# Patient Record
Sex: FEMALE | Race: BLACK OR AFRICAN AMERICAN | Employment: STUDENT | URBAN - METROPOLITAN AREA
[De-identification: names, ages, dates, MRNs, and addresses within clinical notes are randomized per-mention and may not be internally consistent; named-entity substitution may affect disease eponyms.]

---
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
## 2021-01-15 ENCOUNTER — OFFICE VISIT (OUTPATIENT)
Dept: FAMILY MEDICINE CLINIC | Age: 21
End: 2021-01-15
Payer: COMMERCIAL

## 2021-01-15 VITALS
WEIGHT: 122.6 LBS | HEIGHT: 60 IN | OXYGEN SATURATION: 98 % | HEART RATE: 78 BPM | TEMPERATURE: 97.5 F | DIASTOLIC BLOOD PRESSURE: 77 MMHG | BODY MASS INDEX: 24.07 KG/M2 | SYSTOLIC BLOOD PRESSURE: 114 MMHG

## 2021-01-15 DIAGNOSIS — L65.9 ALOPECIA: ICD-10-CM

## 2021-01-15 DIAGNOSIS — N92.6 IRREGULAR MENSTRUAL BLEEDING: Primary | ICD-10-CM

## 2021-01-15 DIAGNOSIS — N92.6 IRREGULAR MENSTRUAL BLEEDING: ICD-10-CM

## 2021-01-15 PROCEDURE — 99214 OFFICE O/P EST MOD 30 MIN: CPT | Performed by: FAMILY MEDICINE

## 2021-01-15 ASSESSMENT — PATIENT HEALTH QUESTIONNAIRE - PHQ9
SUM OF ALL RESPONSES TO PHQ QUESTIONS 1-9: 2
SUM OF ALL RESPONSES TO PHQ QUESTIONS 1-9: 2
1. LITTLE INTEREST OR PLEASURE IN DOING THINGS: 1

## 2021-01-15 NOTE — PROGRESS NOTES
Chief Complaint   Patient presents with    Alopecia     Patient presents today c/o hair loss. Patient states this has been an ongoing problem but getting worse. HPI: Adilene Sosa 24 y.o. female presenting for    Fatigue/Irregular Menstrual Cycles   Patient complaining of hair loss, fatigue, denies any weight gain, denies any irregular menstrual cycles   Periods were heavy on the first day but now more scant. Denies any male pattern hair growth   Hair loss   Patient uses oil, shea moisture hair cream, and hair grease   Shampoos- suave conditioner and shampoo  Hair loss started in 8th grade and has easy breakage but increased this past year  Admits to school stress   Patient does straighten hair with heat but not on a regular basis Denies any blow drying   Admits to braided hair styles   Denies any tension at the root. Dysphasia  Patient is coming in with a one-month history of dysphasia. Patient reports that its only associated with solid foods. Patient reports that when she chews her food and swallow she knows that she is regurgitating the food and has to chew it more before it fully swallows. Patient reports initially there was some halitosis but that has resolved. Denies vomiting. Denies any abdominal pain associated with it. She denies any history of abnormalities of her esophagus. She denies any fevers, chills, nausea, vomiting, chest pain, shortness of breath, abdominal pain, change in urination. F/u  Stable no issues or concerns. Patient is not taking the pecpid prescribed by GI      Diarrhea  Patient reports a 2-month history of diarrhea. Reports that it is off and on. Feels like her symptoms overall improving however notices that she has loose bowel movements. There is no history of ulcerative colitis or Crohn's disease in the family. Denies any bleeding in her stools. Patient has not tried any medications to help with her symptoms.   Patient's last thyroid was over a year ago and was normal.  Denies any anxiety, palpitations, tremors. F/u  Stable. No issues or concerns. Stool studies were normal.         Current Outpatient Medications   Medication Sig Dispense Refill    Multiple Vitamins-Minerals (WOMENS MULTI VITAMIN & MINERAL PO) Take by mouth      Cetirizine HCl (ZYRTEC ALLERGY) 10 MG CAPS Take by mouth      famotidine (PEPCID) 20 MG tablet Take 1 tablet by mouth daily (Patient not taking: Reported on 1/15/2021) 30 tablet 2    minocycline (MINOCIN;DYNACIN) 100 MG capsule Take 100 mg by mouth 2 times daily       No current facility-administered medications for this visit. ROS  CONSTITUTIONAL: The patient denies fevers, chills, sweats and body ache. HEENT: Denies headache, blurry vision, eye pain, tinnitus, vertigo,  sore throat, neck or thyroid masses. Reports difficulty with swallowing. RESPIRATORY: Denies cough, sputum, hemoptysis. CARDIAC: Denies chest pain, pressure, palpitations, Denies lower extremity edema. GASTROINTESTINAL: Denies abdominal pain, constipation, bleeding in the stools, reports diarrhea. GENITOURINARY: Denies dysuria, hematuria, nocturia or frequency, urinary incontinence. NEUROLOGIC: Denies headaches, dizziness, syncope, weakness  MUSCULOSKELETAL: denies changes in range of motion, joint pain, stiffness. ENDOCRINOLOGY: Denies heat or cold intolerance. HEMATOLOGY: Denies easy bleeding or blood transfusion,anemia  DERMATOLOGY: Denies changes in moles or pigmentation changes. PSYCHIATRY: Denies depression, agitation or anxiety. Past Medical History:   Diagnosis Date    Acne     Pes planus     has a prescription for orthotics    Seasonal allergies     zyrtec-D        No past surgical history on file.      Family History   Problem Relation Age of Onset    Diabetes Maternal Grandmother     High Blood Pressure Maternal Grandmother     High Cholesterol Maternal Grandmother     Cancer Maternal Grandmother         breast    Diabetes Maternal Grandfather     High Blood Pressure Maternal Grandfather     High Cholesterol Maternal Grandfather     Kidney Disease Maternal Grandfather     Other Maternal Grandfather         dementia    Thyroid Disease Maternal Aunt     Hypertension Father     Heart Attack Father 50    High Cholesterol Father         Social History     Socioeconomic History    Marital status: Single     Spouse name: Not on file    Number of children: Not on file    Years of education: Not on file    Highest education level: Not on file   Occupational History    Not on file   Social Needs    Financial resource strain: Not hard at all   [place] insecurity     Worry: Never true     Inability: Never true   [employer] needs     Medical: No     Non-medical: No   Tobacco Use    Smoking status: Never Smoker    Smokeless tobacco: Never Used   Substance and Sexual Activity    Alcohol use: No    Drug use: Never    Sexual activity: Never   Lifestyle    Physical activity     Days per week: Not on file     Minutes per session: Not on file    Stress: Not on file   Relationships    Social connections     Talks on phone: Not on file     Gets together: Not on file     Attends [employer] service: Not on file     Active member of club or organization: Not on file     Attends meetings of clubs or organizations: Not on file     Relationship status: Not on file    Intimate partner violence     Fear of current or ex partner: Not on file     Emotionally abused: Not on file     Physically abused: Not on file     Forced sexual activity: Not on file   Other Topics Concern    Not on file   Social History Narrative    Not on file        /77 (Site: Left Upper Arm, Position: Sitting, Cuff Size: Medium Adult)   Pulse 78   Temp 97.5 °F (36.4 °C) (Temporal)   Ht 5' (1.524 m)   Wt 122 lb 9.6 oz (55.6 kg)   SpO2 98%   Breastfeeding No   BMI 23.94 kg/m²        Physical Exam:    General appearance - alert, well appearing, and in no distress  Mental Status - alert, oriented to person, place, and time  Eyes - pupils equal and reactive, extraocular eye movements intact   Ears - bilateral TM's and external ear canals normal   Nose - normal and patent, no erythema, discharge or polyps   Sinuses - Normal paranasal sinuses without tenderness   Throat - mucous membranes moist, pharynx normal without lesions   Neck - supple, no significant adenopathy   Thyroid - thyroid is normal in size without nodules or tenderness    Chest - clear to auscultation, no wheezes, rales or rhonchi, symmetric air entry   Heart - normal rate, regular rhythm, normal S1, S2, no murmurs, rubs, clicks or gallops  Abdomen - soft, nontender, nondistended, no masses or organomegaly. Bowel sounds are present.   Back exam - full range of motion, no tenderness, palpable spasm or pain on motion   Neurological - alert, oriented, normal speech, no focal findings or movement disorder noted   Musculoskeletal - no joint tenderness, deformity or swelling   Extremities - peripheral pulses normal, no pedal edema, no clubbing or cyanosis   Skin - normal coloration and turgor, no rashes, no suspicious skin lesions noted      Labs   TSH   Date Value Ref Range Status   07/16/2020 1.210 0.440 - 3.860 uIU/mL Final     TSH   Date Value Ref Range Status   07/20/2018 0.599 0.270 - 4.200 uIU/mL Final     Lab Results   Component Value Date     07/16/2020    K 4.2 07/16/2020     07/16/2020    CO2 24 07/16/2020    BUN 9 07/16/2020    CREATININE 0.66 07/16/2020    GLUCOSE 87 07/16/2020    CALCIUM 9.5 07/16/2020    PROT 7.8 07/16/2020    LABALBU 4.6 07/16/2020    BILITOT 0.6 07/16/2020    ALKPHOS 68 07/16/2020    AST 25 07/16/2020    ALT 17 07/16/2020    LABGLOM >60.0 07/16/2020    GFRAA >60.0 07/16/2020    GLOB 3.2 07/16/2020       Lab Results   Component Value Date    WBC 3.7 (L) 07/16/2020    HGB 13.8 07/16/2020    HCT 43.1 07/16/2020    MCV 85.5 07/16/2020     07/16/2020 Lab Results   Component Value Date    LABA1C 5.5 07/16/2020     No results found for: EAG        A/P: Eileenbossman Strong 24 y.o. female presenting for    1. Irregular menstrual bleeding  Family history of PCOS. Patient does have irregular menstrual cycles and acne. but is not obese, having hirituism. Is established with a dermatologist   - Follicle Stimulating Hormone; Future  - hCG, quantitative, pregnancy; Future  - Luteinizing Hormone; Future  - Prolactin; Future  - US NON OB TRANSVAGINAL; Future  - US PELVIS COMPLETE; Future    2. Alopecia  Obtain lab levels. Will follow up.

## 2021-01-23 ENCOUNTER — HOSPITAL ENCOUNTER (OUTPATIENT)
Dept: ULTRASOUND IMAGING | Age: 21
Discharge: HOME | End: 2021-01-25
Payer: COMMERCIAL

## 2021-01-23 DIAGNOSIS — N92.6 IRREGULAR MENSTRUAL BLEEDING: ICD-10-CM

## 2021-01-23 PROCEDURE — 76856 US EXAM PELVIC COMPLETE: CPT

## 2021-02-12 DIAGNOSIS — N92.6 IRREGULAR MENSTRUAL BLEEDING: ICD-10-CM

## 2021-02-12 DIAGNOSIS — N92.6 IRREGULAR MENSTRUAL BLEEDING: Primary | ICD-10-CM

## 2021-02-12 LAB
FOLLICLE STIMULATING HORMONE: 7.7 MIU/ML
GONADOTROPIN, CHORIONIC (HCG) QUANT: <0.1 MIU/ML
LUTEINIZING HORMONE: 22.1 MIU/ML
PROLACTIN: 12 NG/ML

## 2021-02-16 LAB — ANA IGG, ELISA: NORMAL

## 2021-03-17 ENCOUNTER — OFFICE VISIT (OUTPATIENT)
Dept: FAMILY MEDICINE CLINIC | Age: 21
End: 2021-03-17
Payer: COMMERCIAL

## 2021-03-17 VITALS
HEART RATE: 81 BPM | TEMPERATURE: 98.1 F | OXYGEN SATURATION: 98 % | BODY MASS INDEX: 23.56 KG/M2 | DIASTOLIC BLOOD PRESSURE: 62 MMHG | HEIGHT: 61 IN | WEIGHT: 124.8 LBS | SYSTOLIC BLOOD PRESSURE: 98 MMHG

## 2021-03-17 DIAGNOSIS — N92.6 IRREGULAR MENSTRUAL BLEEDING: Primary | ICD-10-CM

## 2021-03-17 DIAGNOSIS — L70.0 ACNE VULGARIS: ICD-10-CM

## 2021-03-17 DIAGNOSIS — M79.10 MYALGIA: ICD-10-CM

## 2021-03-17 PROCEDURE — 99214 OFFICE O/P EST MOD 30 MIN: CPT | Performed by: FAMILY MEDICINE

## 2021-03-17 RX ORDER — CETIRIZINE HYDROCHLORIDE 10 MG/1
10 TABLET ORAL DAILY
COMMUNITY

## 2021-03-17 RX ORDER — BIOTIN 1 MG
TABLET ORAL
COMMUNITY
End: 2021-05-21 | Stop reason: SINTOL

## 2021-03-17 ASSESSMENT — PATIENT HEALTH QUESTIONNAIRE - PHQ9
2. FEELING DOWN, DEPRESSED OR HOPELESS: 0
1. LITTLE INTEREST OR PLEASURE IN DOING THINGS: 0
SUM OF ALL RESPONSES TO PHQ9 QUESTIONS 1 & 2: 0

## 2021-03-17 NOTE — PATIENT INSTRUCTIONS
Patient Education        Polycystic Ovary Syndrome: Care Instructions  Your Care Instructions  Polycystic ovary syndrome, or PCOS, means a woman's hormones are out of balance. It can cause problems with your periods and make it hard to get pregnant. Doctors don't know for sure what causes PCOS, but it seems to run in families. It also seems to be linked to obesity and a risk for diabetes. If you have PCOS, your sisters and daughters have a higher chance of getting it too. You may have other symptoms. These include weight gain, acne, too much hair growth on the face or body, high blood pressure, and high blood sugar. Your ovaries may have cysts on them. These cysts are growths filled with fluid. Keep in mind that although you may not have regular periods, you can still get pregnant. Talk to your doctor about birth control if you do not want to get pregnant. Sometimes the hormone changes with PCOS can also make it hard for some women to get pregnant. If this is a concern, talk to your doctor about treatment for this problem. Women who have PCOS can go for months or longer with no period. Your doctor may recommend medicines that can help get your cycles back to normal.  Follow-up care is a key part of your treatment and safety. Be sure to make and go to all appointments, and call your doctor if you are having problems. It's also a good idea to know your test results and keep a list of the medicines you take. How can you care for yourself at home? · Take your medicines exactly as prescribed. Call your doctor if you think you are having a problem with your medicine. · Eat a healthy diet. Include fruits, vegetables, beans, and whole grains in your diet each day. · If you are overweight, losing weight can help with many of the symptoms of PCOS. Talk to your doctor about safe ways to lose weight. · Get at least 30 minutes of exercise on most days of the week. Walking is a good choice.  Or you can run, swim, cycle, or play tennis or team sports. · For hair growth you don't want, try bleaching, plucking, electrolysis, or laser therapy. · Acne can be treated with over-the-counter medicines. Look for ones that have benzoyl peroxide or salicylic acid in them. · If you are feeling sad or depressed, consider talking to a counselor or to other women who have PCOS. It may help. When should you call for help? Call your doctor now or seek immediate medical care if:    · You have severe vaginal bleeding.     · You have new or worse belly or pelvic pain. Watch closely for changes in your health, and be sure to contact your doctor if:    · You do not get better as expected.     · You have unusual vaginal bleeding. Where can you learn more? Go to https://[employer]. org and sign in to your [employer] account. Enter B657 in the [employer] box to learn more about \"Polycystic Ovary Syndrome: Care Instructions. \"     If you do not have an account, please click on the \"Sign Up Now\" link. Current as of: July 17, 2020               Content Version: 12.8  © 5858-7069 Pins. Care instructions adapted under license by [facility] ([facility]). If you have questions about a medical condition or this instruction, always ask your healthcare professional. [place] [address] any warranty or liability for your use of this information. Patient Education        Knee: Exercises  Introduction  Here are some examples of exercises for you to try. The exercises may be suggested for a condition or for rehabilitation. Start each exercise slowly. Ease off the exercises if you start to have pain. You will be told when to start these exercises and which ones will work best for you. How to do the exercises  Quad sets   1. Sit with your leg straight and supported on the floor or a firm bed.  (If you feel discomfort in the front or back of your knee, place a small towel roll under your put that foot in front of your injured knee. Do not drop your hip back. 3. Tighten the muscles on the front of your thigh to straighten your injured knee. 4. Keep your kneecap pointing forward, and lift your whole leg up toward the ceiling about 6 inches. Hold for about 6 seconds, then lower slowly. 5. Do 8 to 12 repetitions. Heel dig bridging   1. Lie on your back with both knees bent and your ankles bent so that only your heels are digging into the floor. Your knees should be bent about 90 degrees. 2. Then push your heels into the floor, squeeze your buttocks, and lift your hips off the floor until your shoulders, hips, and knees are all in a straight line. 3. Hold for about 6 seconds as you continue to breathe normally, and then slowly lower your hips back down to the floor and rest for up to 10 seconds. 4. Do 8 to 12 repetitions. Hamstring curls   1. Lie on your stomach with your knees straight. If your kneecap is uncomfortable, roll up a washcloth and put it under your leg just above your kneecap. 2. Lift the foot of your injured leg by bending the knee so that you bring the foot up toward your buttock. If this motion hurts, try it without bending your knee quite as far. This may help you avoid any painful motion. 3. Slowly lower your leg back to the floor. 4. Do 8 to 12 repetitions. 5. With permission from your doctor or physical therapist, you may also want to add a cuff weight to your ankle (not more than 5 pounds). With weight, you do not have to lift your leg more than 12 inches to get a hamstring workout. Shallow standing knee bends   Do this exercise only if you have very little pain; if you have no clicking, locking, or giving way if you have an injured knee; and if it does not hurt while you are doing 8 to 12 repetitions. 1. Stand with your hands lightly resting on a counter or chair in front of you. Put your feet shoulder-width apart.   2. Slowly bend your knees so that you squat down like you are going to sit in a chair. Make sure your knees do not go in front of your toes. 3. Lower yourself about 6 inches. Your heels should remain on the floor at all times. 4. Rise slowly to a standing position. Heel raises   1. Stand with your feet a few inches apart, with your hands lightly resting on a counter or chair in front of you. 2. Slowly raise your heels off the floor while keeping your knees straight. 3. Hold for about 6 seconds, then slowly lower your heels to the floor. 4. Do 8 to 12 repetitions several times during the day. Follow-up care is a key part of your treatment and safety. Be sure to make and go to all appointments, and call your doctor if you are having problems. It's also a good idea to know your test results and keep a list of the medicines you take. Where can you learn more? Go to https://[employer]. org and sign in to your [email or web address] account. Enter S661 in the [employer] box to learn more about \"Knee: Exercises. \"     If you do not have an account, please click on the \"Sign Up Now\" link. Current as of: November 16, 2020               Content Version: 12.8  © 2006-2021 Healthwise, [employer]. Care instructions adapted under license by [facility] ([facility]). If you have questions about a medical condition or this instruction, always ask your healthcare professional. [address] any warranty or liability for your use of this information. Patient Education        spironolactone  Pronunciation:  spir ON oh LAK tone  Brand:  Aldactone, CaroSpir  What is the most important information I should know about spironolactone? You should not use spironolactone if you [facility] disease, high levels of potassium in your blood, if you are unable to urinate, or if you are also taking eplerenone. What is spironolactone?   Spironolactone is a potassium-sparing diuretic (water pill) that prevents your body from absorbing too much salt and keeps your potassium levels from getting too low. Spironolactone is used to treat heart failure, high blood pressure (hypertension), or hypokalemia (low potassium levels in the blood). Spironolactone also treats fluid retention (edema) in people with congestive heart failure, cirrhosis of the liver, or a kidney disorder called nephrotic syndrome. Spironolactone is also used to diagnose or treat a condition in which you have too much aldosterone in your body. Aldosterone is a hormone produced by your adrenal glands to help regulate the salt and water balance in your body. Spironolactone may also be used for purposes not listed in this medication guide. What should I discuss with my healthcare provider before taking spironolactone? You should not use spironolactone if you are allergic to it, or if you have:  · Minor's disease (an adrenal gland disorder);  · high levels of potassium in your blood (hyperkalemia);  · if you are unable to urinate; or  · if you are also taking eplerenone. Tell your doctor if you have ever had:  · an electrolyte imbalance (such as low levels of calcium, magnesium, or sodium in your blood);  · kidney disease;  · liver disease; or  · heart disease. Tell your doctor if you are pregnant or plan to become pregnant. Having congestive heart failure, cirrhosis, or uncontrolled high blood pressure during pregnancy may lead to medical problems in the mother or the baby. Your doctor should decide whether you take spironolactone if you are pregnant. It may not be safe to breastfeed while using this medicine. Ask your doctor about any risk. How should I take spironolactone? Follow all directions on your prescription label and read all medication guides or instruction sheets. Your doctor may occasionally change your dose. Use the medicine exactly as directed. Do not share this medicine with another person, even if they have the same symptoms you have.   You may take spironolactone with or without food, but take it the same way each time. You will need frequent medical tests. This medicine can affect the results of certain medical tests. Tell any doctor who treats you that you are using spironolactone. If you need surgery, tell your surgeon you currently use this medicine. You may need to stop for a short time. If you are being treated for high blood pressure, keep using this medication even if you feel well. High blood pressure often has no symptoms. You may need to use blood pressure medication for the rest of your life. Store at room temperature away from heat, light, and moisture. What happens if I miss a dose? Take the medicine as soon as you can, but skip the missed dose if it is almost time for your next dose. Do not take two doses at one time. What happens if I overdose? Seek emergency medical attention or call the Poison Help line at 1-515.567.4092. What should I avoid while taking spironolactone? Drinking alcohol can increase certain side effects. Do not use potassium supplements or salt substitutes, unless your doctor has told you to. Avoid a diet high in salt. Too much salt will cause your body to retain water and can make this medication less effective. Avoid driving or hazardous activity until you know how this medicine will affect you. Your reactions could be impaired. Avoid getting up too fast from a sitting or lying position, or you may feel dizzy. What are the possible side effects of spironolactone? Get emergency medical help if you have signs of an allergic reaction: hives; difficulty breathing; swelling of your face, lips, tongue, or throat.   Call your doctor at once if you have:  · a light-headed feeling, like you might pass out;  · little or no urination;  · high potassium level --nausea, weakness, tingly feeling, chest pain, irregular heartbeats, loss of movement; o  · signs of other electrolyte imbalances --increased thirst or urination, confusion, vomiting, muscle pain, slurred speech, severe weakness, numbness, loss of coordination, feeling unsteady. Common side effects may include:  · breast swelling or tenderness. This is not a complete list of side effects and others may occur. Call your doctor for medical advice about side effects. You may report side effects to FDA at 4-229-FDA-2155. What other drugs will affect spironolactone? Using spironolactone with other drugs that make you dizzy can worsen this effect. Ask your doctor before using opioid medication, a sleeping pill, a muscle relaxer, or medicine for anxiety, depression, or seizures. Tell your doctor about all your other medicines, especially:  · colchicine;  · digoxin;  · lithium;  · loperamide;  · trimethoprim;  · heart or blood pressure medicine (especially another diuretic);  · medicine to prevent a blood clot; or  · NSAIDs (nonsteroidal anti-inflammatory drugs) --aspirin, ibuprofen (Advil, Motrin), naproxen (Aleve), celecoxib, diclofenac, indomethacin, meloxicam, and others. This list is not complete. Other drugs may affect spironolactone, including prescription and over-the-counter medicines, vitamins, and herbal products. Not all possible drug interactions are listed here. Where can I get more information? Your pharmacist can provide more information about spironolactone. Remember, keep this and all other medicines out of the reach of children, never share your medicines with others, and use this medication only for the indication prescribed. Every effort has been made to ensure that the information provided by [address] is accurate, up-to-date, and complete, but no guarantee is made to that effect. Drug information contained herein may be time sensitive.  [facility] information has been compiled for use by healthcare practitioners and consumers in the [place] and therefore [employer] does not warrant that uses outside of the [place] are appropriate, unless specifically indicated otherwise. [facility] drug information does not endorse drugs, diagnose patients or recommend therapy. [facility] drug information is an informational resource designed to assist licensed healthcare practitioners in caring for their patients and/or to serve consumers viewing this service as a supplement to, and not a substitute for, the expertise, skill, knowledge and judgment of healthcare practitioners. The absence of a warning for a given drug or drug combination in no way should be construed to indicate that the drug or drug combination is safe, effective or appropriate for any given patient. [facility] does not assume any responsibility for any aspect of healthcare administered with the aid of information [facility] provides. The information contained herein is not intended to cover all possible uses, directions, precautions, warnings, drug interactions, allergic reactions, or adverse effects. If you have questions about the drugs you are taking, check with your doctor, nurse or pharmacist.  Copyright 2988-7892 [address]: 11.01. Revision date: 3/14/2020. Care instructions adapted under license by [facility] ([facility]). If you have questions about a medical condition or this instruction, always ask your healthcare professional. [place] [address] any warranty or liability for your use of this information.

## 2021-03-17 NOTE — PROGRESS NOTES
Chief Complaint   Patient presents with    Follow-up    Irregular Menses     Had US done, would like to discuss more.  Acne        HPI: Keo Tadeo 24 y.o. female presenting for    Fatigue/Irregular Menstrual Cycles   Patient complaining of hair loss, fatigue, denies any weight gain, admits to irregular menstrual cycles   Periods were heavy on the first day but now more scant. Denies any male pattern hair growth   Hair loss   Patient uses oil, shea moisture hair cream, and hair grease   Shampoos- suave conditioner and shampoo  Hair loss started in 8th grade and has easy breakage but increased this past year  Admits to school stress   Patient does straighten hair with heat but not on a regular basis Denies any blow drying   Admits to braided hair styles   Denies any tension at the root. F/u  Lab work was unremarkable however pelvic ultrasound was concerning for possible PCOS (unfortunately were unable to do a transvaginal ultrasound). As you may recall, patient does have acne and irregular menstrual cycles. Reports that she has some aches and pains and feels like her fingers get locked makes it difficult to write. Exercises help. Current Outpatient Medications   Medication Sig Dispense Refill    Biotin 1000 MCG TABS Take by mouth      cetirizine (ZYRTEC) 10 MG tablet Take 10 mg by mouth daily      Multiple Vitamins-Minerals (WOMENS MULTI VITAMIN & MINERAL PO) Take by mouth       No current facility-administered medications for this visit. ROS  CONSTITUTIONAL: The patient denies fevers, chills, sweats and body ache. HEENT: Denies headache, blurry vision, eye pain, tinnitus, vertigo,  sore throat, neck or thyroid masses. Reports difficulty with swallowing. RESPIRATORY: Denies cough, sputum, hemoptysis. CARDIAC: Denies chest pain, pressure, palpitations, Denies lower extremity edema.   GASTROINTESTINAL: Denies abdominal pain, constipation, bleeding in the stools, reports diarrhea. GENITOURINARY: Denies dysuria, hematuria, nocturia or frequency, urinary incontinence. NEUROLOGIC: Denies headaches, dizziness, syncope, weakness  MUSCULOSKELETAL: denies changes in range of motion, joint pain, stiffness. ENDOCRINOLOGY: Denies heat or cold intolerance. HEMATOLOGY: Denies easy bleeding or blood transfusion,anemia  DERMATOLOGY: admits to acne on the face  PSYCHIATRY: Denies depression, agitation or anxiety. Past Medical History:   Diagnosis Date    Acne     Pes planus     has a prescription for orthotics    Seasonal allergies     [id number]        History reviewed. No pertinent surgical history.      Family History   Problem Relation Age of Onset    Diabetes Maternal Grandmother     High Blood Pressure Maternal Grandmother     High Cholesterol Maternal Grandmother     Cancer Maternal Grandmother         breast    Diabetes Maternal Grandfather     High Blood Pressure Maternal Grandfather     High Cholesterol Maternal Grandfather     Kidney Disease Maternal Grandfather     Other Maternal Grandfather         dementia    Thyroid Disease Maternal Aunt     Hypertension Father     Heart Attack Father 50    High Cholesterol Father         Social History     Socioeconomic History    Marital status: Single     Spouse name: Not on file    Number of children: Not on file    Years of education: Not on file    Highest education level: Not on file   Occupational History    Not on file   Social Needs    Financial resource strain: Not hard at all   [place] insecurity     Worry: Never true     Inability: Never true    Transportation needs     Medical: No     Non-medical: No   Tobacco Use    Smoking status: Never Smoker    Smokeless tobacco: Never Used   Substance and Sexual Activity    Alcohol use: No    Drug use: Never    Sexual activity: Never   Lifestyle    Physical activity     Days per week: Not on file     Minutes per session: Not on file    Stress: Not on file Relationships    Social connections     Talks on phone: Not on file     Gets together: Not on file     Attends [employer] service: Not on file     Active member of club or organization: Not on file     Attends meetings of clubs or organizations: Not on file     Relationship status: Not on file    Intimate partner violence     Fear of current or ex partner: Not on file     Emotionally abused: Not on file     Physically abused: Not on file     Forced sexual activity: Not on file   Other Topics Concern    Not on file   Social History Narrative    Not on file        BP 98/62   Pulse 81   Temp 98.1 °F (36.7 °C)   Ht 5' 1\" (1.549 m)   Wt 124 lb 12.8 oz (56.6 kg)   SpO2 98%   BMI 23.58 kg/m²        Physical Exam:    General appearance - alert, well appearing, and in no distress  Mental Status - alert, oriented to person, place, and time  Eyes - pupils equal and reactive, extraocular eye movements intact   Ears - bilateral TM's and external ear canals normal   Nose - normal and patent, no erythema, discharge or polyps   Sinuses - Normal paranasal sinuses without tenderness   Throat - mucous membranes moist, pharynx normal without lesions   Neck - supple, no significant adenopathy   Thyroid - thyroid is normal in size without nodules or tenderness    Chest - clear to auscultation, no wheezes, rales or rhonchi, symmetric air entry   Heart - normal rate, regular rhythm, normal S1, S2, no murmurs, rubs, clicks or gallops  Abdomen - soft, nontender, nondistended, no masses or organomegaly. Bowel sounds are present. Back exam - full range of motion, no tenderness, palpable spasm or pain on motion   Neurological - alert, oriented, normal speech, no focal findings or movement disorder noted   Musculoskeletal - no joint tenderness, deformity or swelling   Extremities - peripheral pulses normal, no pedal edema, no clubbing or cyanosis   Skin -open and close comedones on the face with some scarring.       Labs   TSH   Date Value Ref Range Status   07/16/2020 1.210 0.440 - 3.860 uIU/mL Final     TSH   Date Value Ref Range Status   07/20/2018 0.599 0.270 - 4.200 uIU/mL Final     Lab Results   Component Value Date     07/16/2020    K 4.2 07/16/2020     07/16/2020    CO2 24 07/16/2020    BUN 9 07/16/2020    CREATININE 0.66 07/16/2020    GLUCOSE 87 07/16/2020    CALCIUM 9.5 07/16/2020    PROT 7.8 07/16/2020    LABALBU 4.6 07/16/2020    BILITOT 0.6 07/16/2020    ALKPHOS 68 07/16/2020    AST 25 07/16/2020    ALT 17 07/16/2020    LABGLOM >60.0 07/16/2020    GFRAA >60.0 07/16/2020    GLOB 3.2 07/16/2020       Lab Results   Component Value Date    WBC 3.7 (L) 07/16/2020    HGB 13.8 07/16/2020    HCT 43.1 07/16/2020    MCV 85.5 07/16/2020     07/16/2020     Lab Results   Component Value Date    LABA1C 5.5 07/16/2020     No results found for: EAG        A/P: Morris Pallas 24 y.o. female presenting for    1. Irregular menstrual bleeding  R ultrasound did show multiple follicles which may be related to PCOS. Unfortunately unable to get the size of the follicles due to patient declining the transvaginal ultrasound. Given that patient has irregular menstrual cycles, here to assume, and acne is possible the patient does have PCOS. Offered oral contraception to help with regulating her periods and helping with her acne but patient declined at this time. Would like to do more research. 2. Acne vulgaris  Hormonal acne that may respond to oral contraception. Patient would like to do more research before making a decision. 3. Myalgia  NILAY was negative. Other lab works were negative as well. Patient can try some magnesium to see if that will help as well as continuing with increased hydration.

## 2021-05-21 ENCOUNTER — OFFICE VISIT (OUTPATIENT)
Dept: FAMILY MEDICINE CLINIC | Age: 21
End: 2021-05-21
Payer: COMMERCIAL

## 2021-05-21 VITALS
HEIGHT: 59 IN | SYSTOLIC BLOOD PRESSURE: 118 MMHG | OXYGEN SATURATION: 98 % | HEART RATE: 97 BPM | TEMPERATURE: 97.1 F | WEIGHT: 122 LBS | DIASTOLIC BLOOD PRESSURE: 70 MMHG | BODY MASS INDEX: 24.6 KG/M2

## 2021-05-21 DIAGNOSIS — B96.89 ACUTE BACTERIAL SINUSITIS: ICD-10-CM

## 2021-05-21 DIAGNOSIS — R05.8 PRODUCTIVE COUGH: Primary | ICD-10-CM

## 2021-05-21 DIAGNOSIS — J01.90 ACUTE BACTERIAL SINUSITIS: ICD-10-CM

## 2021-05-21 PROCEDURE — 99213 OFFICE O/P EST LOW 20 MIN: CPT | Performed by: NURSE PRACTITIONER

## 2021-05-21 RX ORDER — BROMPHENIRAMINE MALEATE, PSEUDOEPHEDRINE HYDROCHLORIDE, AND DEXTROMETHORPHAN HYDROBROMIDE 2; 30; 10 MG/5ML; MG/5ML; MG/5ML
1.25 SYRUP ORAL 3 TIMES DAILY PRN
Qty: 27.3 ML | Refills: 0 | Status: SHIPPED | OUTPATIENT
Start: 2021-05-21 | End: 2021-05-28

## 2021-05-21 RX ORDER — FLUTICASONE PROPIONATE 50 MCG
1 SPRAY, SUSPENSION (ML) NASAL DAILY
Qty: 2 BOTTLE | Refills: 1 | Status: SHIPPED | OUTPATIENT
Start: 2021-05-21

## 2021-05-21 RX ORDER — DOXYCYCLINE HYCLATE 100 MG
100 TABLET ORAL 2 TIMES DAILY
Qty: 20 TABLET | Refills: 0 | Status: SHIPPED | OUTPATIENT
Start: 2021-05-21 | End: 2021-05-31

## 2021-05-21 SDOH — ECONOMIC STABILITY: FOOD INSECURITY: WITHIN THE PAST 12 MONTHS, YOU WORRIED THAT YOUR FOOD WOULD RUN OUT BEFORE YOU GOT MONEY TO BUY MORE.: NEVER TRUE

## 2021-05-21 ASSESSMENT — ENCOUNTER SYMPTOMS
COUGH: 1
STRIDOR: 0
WHEEZING: 0
TROUBLE SWALLOWING: 0
CHOKING: 0
CHEST TIGHTNESS: 0
GASTROINTESTINAL NEGATIVE: 1
SHORTNESS OF BREATH: 0
APNEA: 0
SORE THROAT: 1
EYES NEGATIVE: 1

## 2021-05-21 ASSESSMENT — SOCIAL DETERMINANTS OF HEALTH (SDOH): HOW HARD IS IT FOR YOU TO PAY FOR THE VERY BASICS LIKE FOOD, HOUSING, MEDICAL CARE, AND HEATING?: NOT HARD AT ALL

## 2021-05-21 NOTE — LETTER
[address] [facility]  [address] [place] [address]  Phone: [phone]  Fax: JAMAL Sutton CNP    May 21, 2021     Patient: Opal Rodriguez   Date of Visit: 5/21/2021       To Whom it May Concern:    Opal Rodriguez was evaluated during a virtual visit and tested today in the clinic. It is my medical opinion that the patient should not return to work/school until COVID-19 test results are back. We expect results in 2-5 days. If there are questions or concerns, please have the patient contact our office. Thank you for your assistance in this matter.           Sincerely,          Electronically signed by JAMAL Marquez CNP on 5/21/2021 at 4:44 PM

## 2021-05-21 NOTE — PROGRESS NOTES
maternal grandfather and maternal grandmother; Heart Attack (age of onset: 50) in her father; High Blood Pressure in her maternal grandfather and maternal grandmother; High Cholesterol in her father, maternal grandfather, and maternal grandmother; Hypertension in her father; Kidney Disease in her maternal grandfather; Other in her maternal grandfather; Thyroid Disease in her maternal aunt. HISTORY     Patient  reports that she has never smoked. She has never used smokeless tobacco. She reports that she does not drink alcohol and does not use drugs. PHYSICAL EXAM     VITALS  BP: 118/70, Temp: 97.1 °F (36.2 °C), Heart Rate: 97,  , SpO2: 98 %  Physical Exam  Vitals and nursing note reviewed. Constitutional:       General: She is not in acute distress. Appearance: Normal appearance. She is well-developed and normal weight. She is not ill-appearing, toxic-appearing or diaphoretic. HENT:      Head: Normocephalic and atraumatic. Right Ear: Hearing, ear canal and external ear normal. No tenderness. Tympanic membrane is bulging. Tympanic membrane is not erythematous. Left Ear: Hearing, ear canal and external ear normal. No tenderness. Tympanic membrane is bulging. Tympanic membrane is not erythematous. Nose: Congestion present. Right Turbinates: Swollen. Left Turbinates: Swollen. Right Sinus: Frontal sinus tenderness present. Left Sinus: Frontal sinus tenderness present. Mouth/Throat:      Lips: Pink. Mouth: Mucous membranes are moist.      Pharynx: Uvula midline. Posterior oropharyngeal erythema present. No pharyngeal swelling, oropharyngeal exudate or uvula swelling. Tonsils: No tonsillar exudate or tonsillar abscesses. Eyes:      General: No scleral icterus. Right eye: No discharge. Left eye: No discharge. Extraocular Movements: Extraocular movements intact.       Conjunctiva/sclera: Conjunctivae normal.      Pupils: Pupils are equal, round, or probiotic during antibiotic use and for a few days after to help reduce the risk of developing a secondary infection. Take the yogurt or probiotic at least 2 hours after taking the antibiotic.  - Supportive care: Tylenol per label instructions, Bromfed-to help with cough, humidifer, antihistamine, flonase, warm salt water gargles, increased fluids/rest, lozenges. - Quarantine until results for COVID return. Return if symptoms worsen or fail to improve by tomorrow, for follow-up with PCP. Side effects and adverse effects of any medication prescribed today, as well as treatment plan/rationale, follow-up care, and result expectations have been discussed with the patient. Expresses understanding and desires to proceed with treatment plan. Discussed signs and symptoms which require immediate follow-up in ED/call to 911. Understanding verbalized. I have reviewed and updated the electronic medical record. PATIENT REFERRED TO:  Return if symptoms worsen or fail to improve. DISCHARGE MEDICATIONS:  New Prescriptions    BROMPHENIRAMINE-PSEUDOEPHEDRINE-DM (BROMFED DM) 2-30-10 MG/5ML SYRUP    Take 1.3 mLs by mouth 3 times daily as needed for Congestion or Cough    DOXYCYCLINE HYCLATE (VIBRA-TABS) 100 MG TABLET    Take 1 tablet by mouth 2 times daily for 10 days    FLUTICASONE (FLONASE) 50 MCG/ACT NASAL SPRAY    1 spray by Each Nostril route daily     Cannot display discharge medications since this is not an admission.        JAMAL De La Cruz - CNP

## 2021-05-21 NOTE — PATIENT INSTRUCTIONS
We'll call with COVID-19 results  Results will also be available on your [facility] account, if activated    Things to Know:   - Do not leave home except to get medical care while waiting for your results  - Testing does not change treatment--> there is no medication that treats COVID-19  - Drink plenty of fluids and rest as much as needed  - May take over the counter medicine such as ibuprofen (aka Motrin/ Advil) or acetaminophen (aka Tylenol) for pain or fever, follow directions on label  - Continually monitor symptoms + contact a medical provider if symptoms are worsening, such as difficulty breathing  - Avoid exposure to environmental irritants/ allergens where possible    - Practice social distancing and wear a face mask when leaving home is necessary  - Symptoms may develop 2 days to 2 weeks following exposure to the virus- if you are exposed after testing, or if you were in the incubation period when tested, you could become ill with COVID-19 later    Keep a low threshold to go to ER or call 9-1-1 for new or suddenly worsening symptoms --> trouble breathing, high fevers that are unresponsive to fever-reducing medications, difficulty staying awake, vomiting/ unable to keep food or fluids down, any other symptoms that you think could be an emergency.

## 2021-05-23 LAB
SARS-COV-2: NOT DETECTED
SOURCE: NORMAL

## 2021-06-01 NOTE — TELEPHONE ENCOUNTER
2 RN skin check complete with NAMAN Calhoun.   Devices in place PIV, glasses, and tele monitor.  Skin assessed under devices intact.  Confirmed pressure ulcers found on none.  New potential pressure ulcers noted on none. Wound consult placed NA.  The following interventions in place Pt encouraged to turn self and reposition frequently. Pillows in place for comfort and support. Glasses removed during sleeping. Moisturizer in use.     Generalized fragile skin with UE bruising.   Bilat ears red and blanching   Bilat elbows red and blanching  Sacrum intact, red, and blanching, moisturizer applied.   Bilat heels boggy, red, and blanching.  Intact callus to L sole of foot.    Doxy mono?

## 2021-06-28 ENCOUNTER — OFFICE VISIT (OUTPATIENT)
Dept: ENDOCRINOLOGY | Age: 21
End: 2021-06-28
Payer: COMMERCIAL

## 2021-06-28 VITALS
OXYGEN SATURATION: 99 % | SYSTOLIC BLOOD PRESSURE: 108 MMHG | BODY MASS INDEX: 25.24 KG/M2 | HEART RATE: 84 BPM | WEIGHT: 125.2 LBS | HEIGHT: 59 IN | DIASTOLIC BLOOD PRESSURE: 72 MMHG

## 2021-06-28 DIAGNOSIS — N92.6 IRREGULAR PERIODS/MENSTRUAL CYCLES: ICD-10-CM

## 2021-06-28 DIAGNOSIS — E88.81 INSULIN RESISTANCE: ICD-10-CM

## 2021-06-28 DIAGNOSIS — L65.9 ALOPECIA: ICD-10-CM

## 2021-06-28 DIAGNOSIS — R53.83 FATIGUE, UNSPECIFIED TYPE: ICD-10-CM

## 2021-06-28 DIAGNOSIS — L70.9 ACNE, UNSPECIFIED ACNE TYPE: Primary | ICD-10-CM

## 2021-06-28 DIAGNOSIS — E88.819 INSULIN RESISTANCE: ICD-10-CM

## 2021-06-28 LAB
ANION GAP SERPL CALCULATED.3IONS-SCNC: 7 MEQ/L (ref 9–15)
BUN BLDV-MCNC: 8 MG/DL (ref 6–20)
CALCIUM SERPL-MCNC: 9.7 MG/DL (ref 8.5–9.9)
CHLORIDE BLD-SCNC: 102 MEQ/L (ref 95–107)
CO2: 29 MEQ/L (ref 20–31)
CREAT SERPL-MCNC: 0.73 MG/DL (ref 0.5–0.9)
GFR AFRICAN AMERICAN: >60
GFR NON-AFRICAN AMERICAN: >60
GLUCOSE BLD-MCNC: 66 MG/DL (ref 70–99)
HBA1C MFR BLD: 5.2 % (ref 4.8–5.9)
INSULIN: 136.6 UIU/ML (ref 2.6–24.9)
POTASSIUM SERPL-SCNC: 4.3 MEQ/L (ref 3.4–4.9)
SODIUM BLD-SCNC: 138 MEQ/L (ref 135–144)
T4 FREE: 1.2 NG/DL (ref 0.84–1.68)
TSH REFLEX: 0.76 UIU/ML (ref 0.44–3.86)

## 2021-06-28 PROCEDURE — 99203 OFFICE O/P NEW LOW 30 MIN: CPT | Performed by: INTERNAL MEDICINE

## 2021-06-28 RX ORDER — SPIRONOLACTONE 50 MG/1
50 TABLET, FILM COATED ORAL DAILY
Qty: 30 TABLET | Refills: 3 | Status: SHIPPED | OUTPATIENT
Start: 2021-06-28 | End: 2022-03-01 | Stop reason: ALTCHOICE

## 2021-06-28 ASSESSMENT — ENCOUNTER SYMPTOMS
ROS SKIN COMMENTS: ALOPECIA
SWOLLEN GLANDS: 0

## 2021-06-28 NOTE — PROGRESS NOTES
6/28/2021    Assessment:       Diagnosis Orders   1. Acne, unspecified acne type     2. Alopecia  Testosterone Free And Total, Non Male   3. Fatigue, unspecified type  T4, Free    TSH with Reflex    Thyroid Peroxidase Antibody   4. Irregular periods/menstrual cycles     5.  Insulin resistance  Insulin, Total    Basic Metabolic Panel    Hemoglobin A1C         PLAN:     Orders Placed This Encounter   Procedures    T4, Free     Standing Status:   Future     Number of Occurrences:   1     Standing Expiration Date:   6/28/2022    TSH with Reflex     Standing Status:   Future     Number of Occurrences:   1     Standing Expiration Date:   6/28/2022    Testosterone Free And Total, Non Male     Standing Status:   Future     Number of Occurrences:   1     Standing Expiration Date:   6/28/2022    Thyroid Peroxidase Antibody     Standing Status:   Future     Number of Occurrences:   1     Standing Expiration Date:   6/28/2022    Insulin, Total     Standing Status:   Future     Number of Occurrences:   1     Standing Expiration Date:   6/28/2022    Basic Metabolic Panel     Standing Status:   Future     Number of Occurrences:   1     Standing Expiration Date:   6/28/2022    Hemoglobin A1C     Standing Status:   Future     Number of Occurrences:   1     Standing Expiration Date:   6/28/2022     Orders Placed This Encounter   Medications    metFORMIN (GLUCOPHAGE) 500 MG tablet     Sig: Take 1 tablet by mouth 2 times daily (with meals)     Dispense:  180 tablet     Refill:  1    spironolactone (ALDACTONE) 50 MG tablet     Sig: Take 1 tablet by mouth daily     Dispense:  30 tablet     Refill:  3     Will labs for BMP A1c insulin level testosterone level thyroid antibodies    Start patient on Metformin 500 mg twice a day and Aldactone 50 mg daily patient educated extensively regarding insulin resistance high risk for diabetes  More than 50% of 30 minutes spent patient education counseling  Subjective:     Chief Complaint Patient presents with    New Patient    Thyroid Problem     Vitals:    06/28/21 1019 06/28/21 1023   BP: 108/72    Pulse: (!) 107 84   SpO2: 99%    Weight: 125 lb 3.2 oz (56.8 kg)    Height: (!) 4' 11\" (1.499 m)      Wt Readings from Last 3 Encounters:   06/28/21 125 lb 3.2 oz (56.8 kg) (45 %, Z= -0.12)*   05/21/21 122 lb (55.3 kg) (39 %, Z= -0.27)*   03/17/21 124 lb 12.8 oz (56.6 kg) (46 %, Z= -0.10)*     * Growth percentiles are based on CDC (Girls, 2-20 Years) data. BP Readings from Last 3 Encounters:   06/28/21 108/72   05/21/21 118/70   03/17/21 98/62     Patient referred here for hair loss and acne insulin resistance possible PCOS patient has had irregular periods patchy hair loss over scalp increase acne patient denies any weight gain or any cramping pelvic ultrasound did show cysts lab work so far has shown normal thyroid function test and normal prolactin level A1c has been normal  Patient seen by OB/GYN never tried Metformin  Patient does also complain of fatigue and difficulty concentrating denies any signs symptoms of sleep apnea   family history significant for diabetes and thyroid disease      Other  This is a new (Hair loss neck discoloration) problem. The current episode started more than 1 year ago. The problem occurs intermittently. The problem has been waxing and waning. Associated symptoms include fatigue. Pertinent negatives include no anorexia, arthralgias, headaches or swollen glands. Associated symptoms comments: Irregular menstrual periods. Exacerbated by: Possible insulin resistance. She has tried nothing for the symptoms. The treatment provided no relief. Results for Haseeb Cota (MRN [id number]) as of 6/28/2021 13:00   Ref.  Range 2/12/2021 15:44 5/21/2021 19:19 6/28/2021 11:04   Hemoglobin A1C Latest Ref Range: 4.8 - 5.9 %   5.2   FSH Latest Units: mIU/mL 7.7     LH Latest Units: mIU/mL 22.1     Prolactin Latest Units: ng/mL 12.0     hCG Quant Latest Units: mIU/mL <0.1 EXAMINATION: US PELVIS COMPLETE       DATE AND TIME:1/23/2021 1:15 PM       CLINICAL HISTORY: Pelvic pain  N92.6 Irregular menstrual bleeding ICD10        COMPARISON: None       TECHNIQUE:Transabdominal scans.       FINDINGS:       The uterus is anteverted  and measures 6.2 cm.       The endometrium measures  2  mm in maximal AP thickness in the longitudinal plane.        The right ovary measures 2.9 cm.       The left ovary measures 3.0 cm.       Arterial flow is visualized in both ovaries.         Other findings: Multiple tiny bilateral follicles within the ovaries. No adnexal mass or free fluid. Patient declined transvaginal scans.           Impression   SEVERAL SMALL FOLLICLES SEEN WITHIN THE OVARIES BILATERALLY ON THESE TRANSABDOMINAL SCANS. Past Medical History:   Diagnosis Date    Acne     Pes planus     has a prescription for orthotics    Seasonal allergies     [id number]     No past surgical history on file. Social History     Socioeconomic History    Marital status: Single     Spouse name: Not on file    Number of children: Not on file    Years of education: Not on file    Highest education level: Not on file   Occupational History    Not on file   Tobacco Use    Smoking status: Never Smoker    Smokeless tobacco: Never Used   Substance and Sexual Activity    Alcohol use: No    Drug use: Never    Sexual activity: Never   Other Topics Concern    Not on file   Social History Narrative    Not on file     Social Determinants of Health     Financial Resource Strain: Low Risk     Difficulty of Paying Living Expenses: Not hard at all   Food Insecurity: No Food Insecurity    Worried About Running Out of Food in the Last Year: Never true    [address] in the Last Year: Never true   Transportation Needs: No Transportation Needs    Lack of Transportation (Medical): No    Lack of Transportation (Non-Medical):  No   Physical Activity:     Days of Exercise per Week:     Minutes of Exercise per Session:    Stress:     Feeling of Stress :    Social Connections:     Frequency of Communication with Friends and Family:     Frequency of Social Gatherings with Friends and Family:     Attends Druze Services:     Active Member of Clubs or Organizations:     Attends Club or Organization Meetings:     Marital Status:    Intimate Partner Violence:     Fear of Current or Ex-Partner:     Emotionally Abused:     Physically Abused:     Sexually Abused:      Family History   Problem Relation Age of Onset    Diabetes Maternal Grandmother     High Blood Pressure Maternal Grandmother     High Cholesterol Maternal Grandmother     Cancer Maternal Grandmother         breast    Diabetes Maternal Grandfather     High Blood Pressure Maternal Grandfather     High Cholesterol Maternal Grandfather     Kidney Disease Maternal Grandfather     Other Maternal Grandfather         dementia    Thyroid Disease Maternal Aunt     Hypertension Father     Heart Attack Father 50    High Cholesterol Father      Allergies   Allergen Reactions    Penicillins Nausea And Vomiting       Current Outpatient Medications:     cetirizine (ZYRTEC) 10 MG tablet, Take 10 mg by mouth daily, Disp: , Rfl:     Multiple Vitamins-Minerals (WOMENS MULTI VITAMIN & MINERAL PO), Take by mouth, Disp: , Rfl:     fluticasone (FLONASE) 50 MCG/ACT nasal spray, 1 spray by Each Nostril route daily (Patient not taking: Reported on 6/28/2021), Disp: 2 Bottle, Rfl: 1  Lab Results   Component Value Date     07/16/2020    K 4.2 07/16/2020     07/16/2020    CO2 24 07/16/2020    BUN 9 07/16/2020    CREATININE 0.66 07/16/2020    GLUCOSE 87 07/16/2020    CALCIUM 9.5 07/16/2020    PROT 7.8 07/16/2020    LABALBU 4.6 07/16/2020    BILITOT 0.6 07/16/2020    ALKPHOS 68 07/16/2020    AST 25 07/16/2020    ALT 17 07/16/2020    LABGLOM >60.0 07/16/2020    GFRAA >60.0 07/16/2020    GLOB 3.2 07/16/2020     Lab Results   Component Value Date    WBC 3.7 (L) 07/16/2020    HGB 13.8 07/16/2020    HCT 43.1 07/16/2020    MCV 85.5 07/16/2020     07/16/2020     Lab Results   Component Value Date    LABA1C 5.5 07/16/2020     Lab Results   Component Value Date    CHOLFAST 227 (H) 07/16/2020    TRIGLYCFAST 59 07/16/2020    HDL 76 (H) 07/16/2020    HDL 59 09/13/2014    HDL 51 11/09/2013    LDLCALC 139 (H) 07/16/2020    LDLCALC 118 09/13/2014    LDLCALC 122 11/09/2013    CHOL 192 09/13/2014    CHOL 187 11/09/2013    TRIG 73 09/13/2014    TRIG 68 11/09/2013       Lab Results   Component Value Date    TSH 0.599 07/20/2018    TSHREFLEX 1.210 07/16/2020    T4FREE 1.23 07/20/2018       Review of Systems   Constitutional: Positive for fatigue. Cardiovascular: Negative. Gastrointestinal: Negative for anorexia. Endocrine: Negative for cold intolerance and heat intolerance. Genitourinary: Positive for menstrual problem. Negative for pelvic pain. Musculoskeletal: Negative for arthralgias. Skin:        Alopecia    Allergic/Immunologic: Positive for environmental allergies. Neurological: Negative for headaches. All other systems reviewed and are negative. Objective:   Physical Exam  Vitals reviewed. Constitutional:       Appearance: Normal appearance. She is obese. HENT:      Head: Normocephalic and atraumatic. Hair is normal.        Right Ear: External ear normal.      Left Ear: External ear normal.      Nose: Nose normal.      Mouth/Throat:      Pharynx: Oropharynx is clear. Eyes:      General: No scleral icterus. Right eye: No discharge. Left eye: No discharge. Extraocular Movements: Extraocular movements intact. Conjunctiva/sclera: Conjunctivae normal.   Neck:      Trachea: Trachea normal.     Cardiovascular:      Rate and Rhythm: Normal rate and regular rhythm. Heart sounds: Normal heart sounds. Pulmonary:      Effort: Pulmonary effort is normal.      Breath sounds: Normal breath sounds.    Abdominal: Palpations: Abdomen is soft. Musculoskeletal:         General: Normal range of motion. Cervical back: Normal range of motion and neck supple. Feet:    Skin:     General: Skin is warm and dry. Neurological:      General: No focal deficit present. Mental Status: She is alert and oriented to person, place, and time.    Psychiatric:         Mood and Affect: Mood normal.         Behavior: Behavior normal.

## 2021-06-30 LAB — THYROID PEROXIDASE (TPO) ABS: <4 IU/ML (ref 0–25)

## 2021-07-01 LAB
SEX HORMONE BINDING GLOBULIN: 42 NMOL/L (ref 30–135)
TESTOSTERONE FREE: 6.8 PG/ML (ref 0.8–7.4)
TESTOSTERONE, FEMALES/CHILDREN: 47 NG/DL (ref 9–55)

## 2021-07-22 DIAGNOSIS — L30.9 DERMATITIS: Primary | ICD-10-CM

## 2021-12-18 ENCOUNTER — OFFICE VISIT (OUTPATIENT)
Dept: FAMILY MEDICINE CLINIC | Age: 21
End: 2021-12-18
Payer: COMMERCIAL

## 2021-12-18 VITALS
OXYGEN SATURATION: 100 % | WEIGHT: 125 LBS | DIASTOLIC BLOOD PRESSURE: 78 MMHG | HEIGHT: 59 IN | BODY MASS INDEX: 25.2 KG/M2 | HEART RATE: 84 BPM | TEMPERATURE: 97.4 F | SYSTOLIC BLOOD PRESSURE: 118 MMHG

## 2021-12-18 DIAGNOSIS — J02.9 ACUTE PHARYNGITIS, UNSPECIFIED ETIOLOGY: Primary | ICD-10-CM

## 2021-12-18 DIAGNOSIS — R11.0 NAUSEA: ICD-10-CM

## 2021-12-18 DIAGNOSIS — J02.9 SORE THROAT: ICD-10-CM

## 2021-12-18 LAB — S PYO AG THROAT QL: NORMAL

## 2021-12-18 PROCEDURE — 87880 STREP A ASSAY W/OPTIC: CPT | Performed by: PHYSICIAN ASSISTANT

## 2021-12-18 PROCEDURE — 99213 OFFICE O/P EST LOW 20 MIN: CPT | Performed by: PHYSICIAN ASSISTANT

## 2021-12-18 RX ORDER — ONDANSETRON 4 MG/1
4 TABLET, FILM COATED ORAL 3 TIMES DAILY PRN
Qty: 30 TABLET | Refills: 0 | Status: SHIPPED | OUTPATIENT
Start: 2021-12-18

## 2021-12-18 RX ORDER — CEPHALEXIN 500 MG/1
500 CAPSULE ORAL 2 TIMES DAILY
Qty: 20 CAPSULE | Refills: 0 | Status: SHIPPED | OUTPATIENT
Start: 2021-12-18 | End: 2021-12-28

## 2021-12-18 NOTE — PROGRESS NOTES
Subjective:      Patient ID: Robinson Crain is a 22 y.o. female who presents today for:  Chief Complaint   Patient presents with    Pharyngitis     Patient is here c/o sore throat. Pt states she has swelling and redness in her throat. Pt states pain makes eating and swallowing difficult, describes pain as achy and scratchy. Pt states issues has been ongoing for about 4-5 days. Pt presents w/5 days of swollen and sore throat, scratchiness, feeling flushed mild non productive cough. Denies SOB or ear pain. Pain started on R side, initially was feeling as though she would pass out. Past Medical History:   Diagnosis Date    Acne     Pes planus     has a prescription for orthotics    Seasonal allergies     zyrtec-D     No past surgical history on file.   Family History   Problem Relation Age of Onset    Diabetes Maternal Grandmother     High Blood Pressure Maternal Grandmother     High Cholesterol Maternal Grandmother     Cancer Maternal Grandmother         breast    Diabetes Maternal Grandfather     High Blood Pressure Maternal Grandfather     High Cholesterol Maternal Grandfather     Kidney Disease Maternal Grandfather     Other Maternal Grandfather         dementia    Thyroid Disease Maternal Aunt     Hypertension Father     Heart Attack Father 50    High Cholesterol Father      Social History     Socioeconomic History    Marital status: Single     Spouse name: Not on file    Number of children: Not on file    Years of education: Not on file    Highest education level: Not on file   Occupational History    Not on file   Tobacco Use    Smoking status: Never Smoker    Smokeless tobacco: Never Used   Substance and Sexual Activity    Alcohol use: No    Drug use: Never    Sexual activity: Never   Other Topics Concern    Not on file   Social History Narrative    Not on file     Social Determinants of Health     Financial Resource Strain: Low Risk     Difficulty of Paying Living Expenses: Not hard at all   Food Insecurity: No Food Insecurity    Worried About [address] [facility] in the Last Year: Never true    Juan of Food in the Last Year: Never true   Transportation Needs:     Lack of Transportation (Medical): Not on file    Lack of Transportation (Non-Medical): Not on file   Physical Activity:     Days of Exercise per Week: Not on file    Minutes of Exercise per Session: Not on file   Stress:     Feeling of Stress : Not on file   Social Connections:     Frequency of Communication with Friends and Family: Not on file    Frequency of Social Gatherings with Friends and Family: Not on file    Attends Mandaen Services: Not on file    Active Member of [address] [place] [address] or Organizations: Not on file    Attends Club or Organization Meetings: Not on file    Marital Status: Not on file   Intimate Partner Violence:     Fear of Current or Ex-Partner: Not on file    Emotionally Abused: Not on file    Physically Abused: Not on file    Sexually Abused: Not on file   Housing Stability:     Unable to Pay for Housing in the Last Year: Not on file    Number of [place] in the Last Year: Not on file    Unstable Housing in the Last Year: Not on file     Current Outpatient Medications on File Prior to Visit   Medication Sig Dispense Refill    spironolactone (ALDACTONE) 50 MG tablet Take 1 tablet by mouth daily 30 tablet 3    cetirizine (ZYRTEC) 10 MG tablet Take 10 mg by mouth daily      Multiple Vitamins-Minerals (WOMENS MULTI VITAMIN & MINERAL PO) Take by mouth      fluticasone (FLONASE) 50 MCG/ACT nasal spray 1 spray by Each Nostril route daily (Patient not taking: Reported on 6/28/2021) 2 Bottle 1     No current facility-administered medications on file prior to visit. Penicillins    Review of Systems   Constitutional: Negative for chills and fever. HENT: Positive for sore throat and trouble swallowing. Negative for ear discharge and ear pain. Respiratory: Positive for cough. Negative for shortness of breath. All other systems reviewed and are negative. Objective:   /78 (Site: Left Upper Arm, Position: Sitting, Cuff Size: Small Adult)   Pulse 84   Temp 97.4 °F (36.3 °C)   Ht 4' 11\" (1.499 m)   Wt 125 lb (56.7 kg)   SpO2 100%   BMI 25.25 kg/m²     Physical Exam  Vitals and nursing note reviewed. Constitutional:       Appearance: She is well-developed. HENT:      Head: Normocephalic and atraumatic. Right Ear: Tympanic membrane normal. There is no impacted cerumen. Left Ear: Tympanic membrane normal. There is no impacted cerumen. Nose: No congestion. Mouth/Throat:      Mouth: Mucous membranes are moist.      Pharynx: Posterior oropharyngeal erythema present. No oropharyngeal exudate. Eyes:      General: No scleral icterus. Extraocular Movements: Extraocular movements intact. Conjunctiva/sclera: Conjunctivae normal.      Pupils: Pupils are equal, round, and reactive to light. Neck:      Thyroid: No thyromegaly. Cardiovascular:      Rate and Rhythm: Normal rate and regular rhythm. Heart sounds: Normal heart sounds. No murmur heard. Pulmonary:      Effort: Pulmonary effort is normal. No respiratory distress. Breath sounds: Normal breath sounds. No wheezing. Abdominal:      General: Bowel sounds are normal. There is no distension. Palpations: Abdomen is soft. Musculoskeletal:         General: Normal range of motion. Cervical back: Normal range of motion and neck supple. Skin:     General: Skin is warm and dry. Neurological:      General: No focal deficit present. Mental Status: She is alert and oriented to person, place, and time. Cranial Nerves: No cranial nerve deficit. Psychiatric:         Mood and Affect: Mood normal.         Behavior: Behavior normal.         Thought Content: Thought content normal.         Judgment: Judgment normal.         Assessment:       Diagnosis Orders   1.  Acute pharyngitis, unspecified etiology  cephALEXin (KEFLEX) 500 MG capsule   2. Sore throat  POCT rapid strep A    Culture, Throat   3. Nausea  ondansetron (ZOFRAN) 4 MG tablet       Plan:      Orders Placed This Encounter   Procedures    Culture, Throat     Standing Status:   Future     Number of Occurrences:   1     Standing Expiration Date:   12/18/2022    POCT rapid strep A       Orders Placed This Encounter   Medications    cephALEXin (KEFLEX) 500 MG capsule     Sig: Take 1 capsule by mouth 2 times daily for 10 days     Dispense:  20 capsule     Refill:  0    ondansetron (ZOFRAN) 4 MG tablet     Sig: Take 1 tablet by mouth 3 times daily as needed for Nausea or Vomiting     Dispense:  30 tablet     Refill:  0       Return if symptoms worsen or fail to improve, for follow up w/PCP in 1-2 weeks. Reviewed with the patient: current clinicalstatus, medications, activities and diet. Side effects, adverse effects of the medication prescribedtoday, as well as treatment plan/ rationale and result expectations have been discussedwith the patient who expresses understanding and desires to proceed. Close follow upto evaluate treatment results and for coordination of care. I have reviewedthe patient's medical history in detail and updated the computerized patient record.     Faraz Villegas PA-C

## 2021-12-18 NOTE — LETTER
[facility]  [address] [facility] [address]  Phone: [phone]  Fax: [phone]    Jerrydali Means        December 18, 2021     Patient: Josephine Villa   YOB: 2000   Date of Visit: 12/18/2021       To Whom it May Concern:    Josephine Villa was seen in my clinic on 12/18/2021. She may return to work on 12/19/2021 with no restrictions. If you have any questions or concerns, please don't hesitate to call.     Sincerely,         James Calvillo PA-C

## 2021-12-18 NOTE — Clinical Note
[facility]  [address] [facility] [address]  Phone: [phone]  Fax: [phone]    Patricia Kramer        December 18, 2021     Patient: Robinson Crain   YOB: 2000   Date of Visit: 12/18/2021       To Whom It May Concern: It is my medical opinion that Robinson Crain {Work release (duty restriction):[id number]}. If you have any questions or concerns, please don't hesitate to call.     Sincerely,        Vincent Franz PA-C

## 2021-12-20 DIAGNOSIS — J02.9 SORE THROAT: ICD-10-CM

## 2021-12-23 LAB — THROAT CULTURE: NORMAL

## 2022-01-18 ENCOUNTER — VIRTUAL VISIT (OUTPATIENT)
Dept: ENDOCRINOLOGY | Age: 22
End: 2022-01-18
Payer: COMMERCIAL

## 2022-01-18 DIAGNOSIS — E88.81 INSULIN RESISTANCE: Primary | ICD-10-CM

## 2022-01-18 DIAGNOSIS — L65.9 ALOPECIA: ICD-10-CM

## 2022-01-18 PROCEDURE — 99213 OFFICE O/P EST LOW 20 MIN: CPT | Performed by: INTERNAL MEDICINE

## 2022-01-18 NOTE — PROGRESS NOTES
2022    TELEHEALTH EVALUATION -- Audio/Visual (During FRGJV-48 public health emergency)    Due to COVID 19 outbreak, patient's office visit was converted to a virtual visit. Patient was contacted and agreed to proceed with a virtual visit via [email or web address]  The risks and benefits of converting to a virtual visit were discussed in light of the current infectious disease epidemic. Patient also understood that insurance coverage and co-pays are up to their individual insurance plans. HPI: Video visit patient was at home I was at my office    Cary Sandifer (:  2000) has requested an audio/video evaluation for the following concern(s):    Follow-up on insulin resistance history of irregular menstrual. Alopecia started patient on metformin and Aldactone no recent labs have been reviewed labs were done in  showed increased insulin level  Blood sugars have been normal    Meds reviewed updated no changes      Results for Roosevelt Tadeo (MRN [id number]) as of 2022 17:52   Ref.  Range 2021 11:04   Sodium Latest Ref Range: 135 - 144 mEq/L 138   Potassium Latest Ref Range: 3.4 - 4.9 mEq/L 4.3   Chloride Latest Ref Range: 95 - 107 mEq/L 102   CO2 Latest Ref Range: 20 - 31 mEq/L 29   BUN Latest Ref Range: 6 - 20 mg/dL 8   Creatinine Latest Ref Range: 0.50 - 0.90 mg/dL 0.73   Anion Gap Latest Ref Range: 9 - 15 mEq/L 7 (L)   GFR Non- Latest Ref Range: >60  >60.0   GFR African American Latest Ref Range: >60  >60.0   Glucose Latest Ref Range: 70 - 99 mg/dL 66 (L)   CALCIUM, SERUM, [id number] Latest Ref Range: 8.5 - 9.9 mg/dL 9.7   Hemoglobin A1C Latest Ref Range: 4.8 - 5.9 % 5.2   Sex Hormone Binding Latest Ref Range: 30 - 135 nmol/L 42   Testosterone, Free Latest Ref Range: 0.8 - 7.4 pg/mL 6.8   Testosterone, Females/Children Latest Ref Range: 9 - 55 ng/dL 47   Insulin Latest Ref Range: 2.6 - 24.9 uIU/mL 136.6 (H)   TSH Latest Ref Range: 0.440 - 3.860 uIU/mL 0.762   THYROID PEROXIDASE (TPO) ABS Latest Ref Range: 0.0 - 25.0 IU/mL <4.0   T4 Free Latest Ref Range: 0.84 - 1.68 ng/dL 1.20       Review of Systems   Cardiovascular: Negative. Endocrine: Negative. Genitourinary: Negative. All other systems reviewed and are negative. Prior to Visit Medications    Medication Sig Taking?  Authorizing Provider   metFORMIN (GLUCOPHAGE) 500 MG tablet Take 1 tablet by mouth 2 times daily (with meals)  Bebeto Lagos MD   ondansetron (ZOFRAN) 4 MG tablet Take 1 tablet by mouth 3 times daily as needed for Nausea or Vomiting  Cathryn Oleary PA-C   spironolactone (ALDACTONE) 50 MG tablet Take 1 tablet by mouth daily  Stephen Anthony MD   fluticasone (FLONASE) 50 MCG/ACT nasal spray 1 spray by Each Nostril route daily  Patient not taking: Reported on 6/28/2021  JAMAL Majano - CNP   cetirizine (ZYRTEC) 10 MG tablet Take 10 mg by mouth daily  Historical Provider, MD   Multiple Vitamins-Minerals (WOMENS MULTI VITAMIN & MINERAL PO) Take by mouth  Historical Provider, MD       Social History     Tobacco Use    Smoking status: Never Smoker    Smokeless tobacco: Never Used   Substance Use Topics    Alcohol use: No    Drug use: Never            PHYSICAL EXAMINATION:  [ INSTRUCTIONS:  \"[x]\" Indicates a positive item  \"[]\" Indicates a negative item  -- DELETE ALL ITEMS NOT EXAMINED]  [] Alert  [] Oriented to person/place/time    [] No apparent distress  [] Toxic appearing    [] Face flushed appearing [] Sclera clear  [] Lips are cyanotic      [] Breathing appears normal  [] Appears tachypneic      [] Rash on visible skin    [] Cranial Nerves II-XII grossly intact    [] Motor grossly intact in visible upper extremities    [] Motor grossly intact in visible lower extremities    [] Normal Mood  [] Anxious appearing    [] Depressed appearing  [] Confused appearing      [] Poor short term memory  [] Poor long term memory    [] OTHER:      Due to this being a TeleHealth encounter, evaluation of the following organ systems is limited: Vitals/Constitutional/EENT/Resp/CV/GI//MS/Neuro/Skin/Heme-Lymph-Imm. ASSESSMENT/PLAN:   Diagnosis Orders   1. Insulin resistance     2. Alopecia  Basic Metabolic Panel    Insulin, Total     Orders Placed This Encounter   Procedures    Basic Metabolic Panel     Standing Status:   Future     Standing Expiration Date:   1/18/2023    Insulin, Total     Standing Status:   Future     Standing Expiration Date:   1/18/2023     Continue current medication regimen of metformin and Aldactone follow-up in 2 to 3 months time repeat labs with BMP insulin level prior to next visit        An  electronic signature was used to authenticate this note. --Reina Tillman MD on 1/18/2022 at 5:51 PM        Pursuant to the emergency declaration under the [facility] waiver authority and the [employer] and [employer] Act, this Virtual  Visit was conducted, with patient's consent, to reduce the patient's risk of exposure to COVID-19 and provide continuity of care for an established patient. Services were provided through a video synchronous discussion virtually to substitute for in-person clinic visit.

## 2022-01-21 ASSESSMENT — ENCOUNTER SYMPTOMS
TROUBLE SWALLOWING: 1
SORE THROAT: 1
COUGH: 1
SHORTNESS OF BREATH: 0

## 2022-03-01 ENCOUNTER — OFFICE VISIT (OUTPATIENT)
Dept: ENDOCRINOLOGY | Age: 22
End: 2022-03-01
Payer: COMMERCIAL

## 2022-03-01 VITALS
DIASTOLIC BLOOD PRESSURE: 67 MMHG | HEIGHT: 59 IN | OXYGEN SATURATION: 100 % | SYSTOLIC BLOOD PRESSURE: 100 MMHG | WEIGHT: 132 LBS | BODY MASS INDEX: 26.61 KG/M2 | HEART RATE: 72 BPM

## 2022-03-01 DIAGNOSIS — L65.9 ALOPECIA: ICD-10-CM

## 2022-03-01 DIAGNOSIS — R59.1 LYMPHADENOPATHY: ICD-10-CM

## 2022-03-01 DIAGNOSIS — E88.81 INSULIN RESISTANCE: Primary | ICD-10-CM

## 2022-03-01 DIAGNOSIS — E88.81 INSULIN RESISTANCE: ICD-10-CM

## 2022-03-01 LAB
ANION GAP SERPL CALCULATED.3IONS-SCNC: 8 MEQ/L (ref 9–15)
BUN BLDV-MCNC: 11 MG/DL (ref 6–20)
CALCIUM SERPL-MCNC: 9.5 MG/DL (ref 8.5–9.9)
CHLORIDE BLD-SCNC: 100 MEQ/L (ref 95–107)
CO2: 26 MEQ/L (ref 20–31)
CORTISOL COLLECTION INFO: NORMAL
CORTISOL: 8.7 UG/DL (ref 2.7–18.4)
CREAT SERPL-MCNC: 0.63 MG/DL (ref 0.5–0.9)
GFR AFRICAN AMERICAN: >60
GFR NON-AFRICAN AMERICAN: >60
GLUCOSE BLD-MCNC: 89 MG/DL (ref 70–99)
HCT VFR BLD CALC: 43.2 % (ref 37–47)
HEMOGLOBIN: 13.7 G/DL (ref 12–16)
MCH RBC QN AUTO: 27 PG (ref 27–31.3)
MCHC RBC AUTO-ENTMCNC: 31.6 % (ref 33–37)
MCV RBC AUTO: 85.3 FL (ref 82–100)
PDW BLD-RTO: 13 % (ref 11.5–14.5)
PLATELET # BLD: 245 K/UL (ref 130–400)
POTASSIUM SERPL-SCNC: 4.6 MEQ/L (ref 3.4–4.9)
RBC # BLD: 5.06 M/UL (ref 4.2–5.4)
SODIUM BLD-SCNC: 134 MEQ/L (ref 135–144)
T4 FREE: 1.38 NG/DL (ref 0.84–1.68)
TSH SERPL DL<=0.05 MIU/L-ACNC: 1.11 UIU/ML (ref 0.44–3.86)
WBC # BLD: 4.3 K/UL (ref 4.5–11)

## 2022-03-01 PROCEDURE — 99213 OFFICE O/P EST LOW 20 MIN: CPT | Performed by: INTERNAL MEDICINE

## 2022-03-01 ASSESSMENT — ENCOUNTER SYMPTOMS: SWOLLEN GLANDS: 1

## 2022-03-01 NOTE — PROGRESS NOTES
3/1/2022    Assessment:       Diagnosis Orders   1. Insulin resistance  Basic Metabolic Panel    T4, Free    TSH   2. Alopecia     3. Lymphadenopathy  RICARDO - Boo Mullins MD, Otolaryngology, [facility] [address] SOFT TISSUE THYROID    CBC    Cortisol Total         PLAN:     Orders Placed This Encounter   Procedures    US HEAD NECK SOFT TISSUE THYROID     This procedure can be scheduled via [employer]. Access your [employer] account by visiting [email or web address]. Standing Status:   Future     Standing Expiration Date:   3/1/2023    Basic Metabolic Panel     Standing Status:   Future     Standing Expiration Date:   3/1/2023    T4, Free     Standing Status:   Future     Standing Expiration Date:   3/1/2023    TSH     Standing Status:   Future     Standing Expiration Date:   3/1/2023    CBC     Standing Status:   Future     Standing Expiration Date:   3/1/2023    Cortisol Total     Standing Status:   Future     Standing Expiration Date:   3/1/2023     Order Specific Question:   8AM or 4PM?     Answer:   purvi Dominguez MD, Otolaryngology, [facility]     Referral Priority:   Routine     Referral Type:   Eval and Treat     Referral Reason:   Specialty Services Required     Referred to Provider:   Rafael Pickard MD     Requested Specialty:   Otolaryngology     Number of Visits Requested:   1     Orders Placed This Encounter   Medications    metFORMIN (GLUCOPHAGE) 500 MG tablet     Sig: Once a day     Dispense:  90 tablet     Refill:  3       Subjective:     Chief Complaint   Patient presents with    Alopecia    Other    Fatigue     Vitals:    03/01/22 0937   BP: 100/67   Pulse: 72   SpO2: 100%   Weight: 132 lb (59.9 kg)   Height: 4' 11\" (1.499 m)     Wt Readings from Last 3 Encounters:   03/01/22 132 lb (59.9 kg)   12/18/21 125 lb (56.7 kg) (44 %, Z= -0.16)*   06/28/21 125 lb 3.2 oz (56.8 kg) (45 %, Z= -0.12)*     * Growth percentiles are based on CDC (Girls, 2-20 Years) data.      BP Readings from Last 3 Encounters:   03/01/22 100/67   12/18/21 118/78   06/28/21 108/72     Follow-up on insulin resistance history of alopecia labs were done last year showing increased her insulin level patient was started on Metformin 500 mg twice a day has changed to once a day due to GI side effects also has noticed hair loss and lymph node enlargement on her chin  Does have acne denies any facial hair denies any fever chills does have some symptoms of arthritis  Has gained also some weight  Aldactone did not help her with alopecia wants to discontinue    Other  This is a recurrent (Insulin resistance) problem. The current episode started more than 1 year ago. Associated symptoms include arthralgias, fatigue and swollen glands. Associated symptoms comments: Weight gain. Treatments tried: Metformin. The treatment provided mild relief. Results for Tyrel Garcia (MRN [id number]) as of 3/1/2022 10:13   Ref.  Range 5/21/2021 19:19 6/28/2021 11:04   Sodium Latest Ref Range: 135 - 144 mEq/L  138   Potassium Latest Ref Range: 3.4 - 4.9 mEq/L  4.3   Chloride Latest Ref Range: 95 - 107 mEq/L  102   CO2 Latest Ref Range: 20 - 31 mEq/L  29   BUN,BUNPL Latest Ref Range: 6 - 20 mg/dL  8   Creatinine Latest Ref Range: 0.50 - 0.90 mg/dL  0.73   Anion Gap Latest Ref Range: 9 - 15 mEq/L  7 (L)   GFR Non- Latest Ref Range: >60   >60.0   GFR African American Latest Ref Range: >60   >60.0   GLUCOSE, FASTING,GF Latest Ref Range: 70 - 99 mg/dL  66 (L)   CALCIUM, SERUM, [id number] Latest Ref Range: 8.5 - 9.9 mg/dL  9.7   Hemoglobin A1C Latest Ref Range: 4.8 - 5.9 %  5.2   Sex Hormone Binding Latest Ref Range: 30 - 135 nmol/L  42   TESTOSTERONE, FREE,TF Latest Ref Range: 0.8 - 7.4 pg/mL  6.8   Testosterone, Females/Children Latest Ref Range: 9 - 55 ng/dL  47   INSULIN,INS Latest Ref Range: 2.6 - 24.9 uIU/mL  136.6 (H)   TSH Latest Ref Range: 0.440 - 3.860 uIU/mL  0.762   THYROID PEROXIDASE (TPO) ABS Latest Ref Range: 0.0 - 25.0 IU/mL  <4.0   T4 Free Latest Ref Range: 0.84 - 1.68 ng/dL  1.20   SARS-CoV-2 Latest Ref Range: Not Detected  Not Detected        Past Medical History:   Diagnosis Date    Acne     Pes planus     has a prescription for orthotics    Seasonal allergies     [id number]     History reviewed. No pertinent surgical history. Social History     Socioeconomic History    Marital status: Single     Spouse name: Not on file    Number of children: Not on file    Years of education: Not on file    Highest education level: Not on file   Occupational History    Not on file   Tobacco Use    Smoking status: Never Smoker    Smokeless tobacco: Never Used   Substance and Sexual Activity    Alcohol use: No    Drug use: Never    Sexual activity: Never   Other Topics Concern    Not on file   Social History Narrative    Not on file     Social Determinants of Health     Financial Resource Strain: Low Risk     Difficulty of Paying Living Expenses: Not hard at all   Food Insecurity: No Food Insecurity    Worried About Running Out of Food in the Last Year: Never true    [address] in the Last Year: Never true   Transportation Needs:     Lack of Transportation (Medical): Not on file    Lack of Transportation (Non-Medical):  Not on file   Physical Activity:     Days of Exercise per Week: Not on file    Minutes of Exercise per Session: Not on file   Stress:     Feeling of Stress : Not on file   Social Connections:     Frequency of Communication with Friends and Family: Not on file    Frequency of Social Gatherings with Friends and Family: Not on file    Attends Catholic Services: Not on file    Active Member of Clubs or Organizations: Not on file    Attends Club or Organization Meetings: Not on file    Marital Status: Not on file   Intimate Partner Violence:     Fear of Current or Ex-Partner: Not on file    Emotionally Abused: Not on file    Physically Abused: Not on file    Sexually Abused: Not on file Housing Stability:     Unable to Pay for Housing in the Last Year: Not on file    Number of Places Lived in the Last Year: Not on file    Unstable Housing in the Last Year: Not on file     Family History   Problem Relation Age of Onset    Diabetes Maternal Grandmother     High Blood Pressure Maternal Grandmother     High Cholesterol Maternal Grandmother     Cancer Maternal Grandmother         breast    Diabetes Maternal Grandfather     High Blood Pressure Maternal Grandfather     High Cholesterol Maternal Grandfather     Kidney Disease Maternal Grandfather     Other Maternal Grandfather         dementia    Thyroid Disease Maternal Aunt     Hypertension Father     Heart Attack Father 50    High Cholesterol Father      Allergies   Allergen Reactions    Penicillins Nausea And Vomiting       Current Outpatient Medications:     metFORMIN (GLUCOPHAGE) 500 MG tablet, Take 1 tablet by mouth 2 times daily (with meals), Disp: 180 tablet, Rfl: 0    ondansetron (ZOFRAN) 4 MG tablet, Take 1 tablet by mouth 3 times daily as needed for Nausea or Vomiting, Disp: 30 tablet, Rfl: 0    spironolactone (ALDACTONE) 50 MG tablet, Take 1 tablet by mouth daily, Disp: 30 tablet, Rfl: 3    fluticasone (FLONASE) 50 MCG/ACT nasal spray, 1 spray by Each Nostril route daily, Disp: 2 Bottle, Rfl: 1    cetirizine (ZYRTEC) 10 MG tablet, Take 10 mg by mouth daily, Disp: , Rfl:     Multiple Vitamins-Minerals (WOMENS MULTI VITAMIN & MINERAL PO), Take by mouth, Disp: , Rfl:   Lab Results   Component Value Date     06/28/2021    K 4.3 06/28/2021     06/28/2021    CO2 29 06/28/2021    BUN 8 06/28/2021    CREATININE 0.73 06/28/2021    GLUCOSE 66 (L) 06/28/2021    CALCIUM 9.7 06/28/2021    PROT 7.8 07/16/2020    LABALBU 4.6 07/16/2020    BILITOT 0.6 07/16/2020    ALKPHOS 68 07/16/2020    AST 25 07/16/2020    ALT 17 07/16/2020    LABGLOM >60.0 06/28/2021    GFRAA >60.0 06/28/2021    GLOB 3.2 07/16/2020     Lab Results Component Value Date    WBC 3.7 (L) 07/16/2020    HGB 13.8 07/16/2020    HCT 43.1 07/16/2020    MCV 85.5 07/16/2020     07/16/2020     Lab Results   Component Value Date    LABA1C 5.2 06/28/2021    LABA1C 5.5 07/16/2020     Lab Results   Component Value Date    CHOLFAST 227 (H) 07/16/2020    TRIGLYCFAST 59 07/16/2020    HDL 76 (H) 07/16/2020    HDL 59 09/13/2014    HDL 51 11/09/2013    LDLCALC 139 (H) 07/16/2020    LDLCALC 118 09/13/2014    LDLCALC 122 11/09/2013    CHOL 192 09/13/2014    CHOL 187 11/09/2013    TRIG 73 09/13/2014    TRIG 68 11/09/2013     No results found for: TESTM  Lab Results   Component Value Date    TSH 0.599 07/20/2018    TSHREFLEX 0.762 06/28/2021    TSHREFLEX 1.210 07/16/2020    T4FREE 1.20 06/28/2021    T4FREE 1.23 07/20/2018     Lab Results   Component Value Date    TPOABS <4.0 06/28/2021       Review of Systems   Constitutional: Positive for fatigue and unexpected weight change. Musculoskeletal: Positive for arthralgias. Skin:        Alopecia acne   Hematological: Positive for adenopathy. All other systems reviewed and are negative. Objective:   Physical Exam  Vitals reviewed. Constitutional:       Appearance: Normal appearance. HENT:      Head: Normocephalic and atraumatic. Hair is abnormal.        Right Ear: External ear normal.      Left Ear: External ear normal.      Nose: Nose normal.   Eyes:      General: No scleral icterus. Right eye: No discharge. Left eye: No discharge. Extraocular Movements: Extraocular movements intact. Conjunctiva/sclera: Conjunctivae normal.   Neck:      Trachea: Trachea normal.     Cardiovascular:      Rate and Rhythm: Normal rate. Pulmonary:      Effort: Pulmonary effort is normal.   Musculoskeletal:         General: Normal range of motion. Cervical back: Normal range of motion and neck supple. Neurological:      General: No focal deficit present. Mental Status: She is alert.    Psychiatric: Mood and Affect: Mood normal.         Behavior: Behavior normal.

## 2022-04-01 ENCOUNTER — HOSPITAL ENCOUNTER (OUTPATIENT)
Dept: ULTRASOUND IMAGING | Age: 22
Discharge: HOME | End: 2022-04-03
Payer: COMMERCIAL

## 2022-04-01 ENCOUNTER — OFFICE VISIT (OUTPATIENT)
Dept: ENDOCRINOLOGY | Age: 22
End: 2022-04-01
Payer: COMMERCIAL

## 2022-04-01 VITALS
SYSTOLIC BLOOD PRESSURE: 94 MMHG | BODY MASS INDEX: 27.21 KG/M2 | DIASTOLIC BLOOD PRESSURE: 60 MMHG | OXYGEN SATURATION: 98 % | WEIGHT: 135 LBS | HEART RATE: 74 BPM | HEIGHT: 59 IN

## 2022-04-01 DIAGNOSIS — R59.1 LYMPHADENOPATHY: ICD-10-CM

## 2022-04-01 DIAGNOSIS — E88.81 INSULIN RESISTANCE: ICD-10-CM

## 2022-04-01 DIAGNOSIS — L65.9 ALOPECIA: Primary | ICD-10-CM

## 2022-04-01 PROCEDURE — 76536 US EXAM OF HEAD AND NECK: CPT

## 2022-04-01 PROCEDURE — 99213 OFFICE O/P EST LOW 20 MIN: CPT | Performed by: INTERNAL MEDICINE

## 2022-04-01 ASSESSMENT — ENCOUNTER SYMPTOMS: TROUBLE SWALLOWING: 0

## 2022-04-01 NOTE — PROGRESS NOTES
4/1/2022    Assessment:       Diagnosis Orders   1. Alopecia     2. Lymphadenopathy     3. Insulin resistance           PLAN:     Continue Metformin patient to have thyroid ultrasound done today follow-up in 2 to 3 months time patient also will follow up with ENT      Subjective:     Chief Complaint   Patient presents with    Alopecia     test results    Fatigue    Thyroid Problem     Vitals:    04/01/22 0941   Weight: 135 lb (61.2 kg)   Height: 4' 11\" (1.499 m)     Wt Readings from Last 3 Encounters:   04/01/22 135 lb (61.2 kg)   03/01/22 132 lb (59.9 kg)   12/18/21 125 lb (56.7 kg) (44 %, Z= -0.16)*     * Growth percentiles are based on CDC (Girls, 2-20 Years) data. BP Readings from Last 3 Encounters:   03/01/22 100/67   12/18/21 118/78   06/28/21 108/72     Follow-up on lymphadenopathy history of insulin resistance patient was started on Metformin labs were done reviewed normal chemistries thyroid function test  Patient went to have thyroid ultrasound done today also will see ENT later    Other  This is a recurrent (Insulin resistance) problem. The current episode started more than 1 year ago. The problem occurs intermittently. The problem has been unchanged. Pertinent negatives include no fatigue. Treatments tried: Metformin. The treatment provided mild relief. Results for Shea Rodriguez (MRN [id number]) as of 4/1/2022 09:44   Ref.  Range 3/1/2022 10:15   Sodium Latest Ref Range: 135 - 144 mEq/L 134 (L)   Potassium Latest Ref Range: 3.4 - 4.9 mEq/L 4.6   Chloride Latest Ref Range: 95 - 107 mEq/L 100   CO2 Latest Ref Range: 20 - 31 mEq/L 26   BUN,BUNPL Latest Ref Range: 6 - 20 mg/dL 11   Creatinine Latest Ref Range: 0.50 - 0.90 mg/dL 0.63   Anion Gap Latest Ref Range: 9 - 15 mEq/L 8 (L)   GFR Non- Latest Ref Range: >60  >60.0   GFR African American Latest Ref Range: >60  >60.0   GLUCOSE, FASTING,GF Latest Ref Range: 70 - 99 mg/dL 89   CALCIUM, SERUM, [id number] Latest Ref Range: 8.5 - 9.9  Frequency of Communication with Friends and Family: Not on file    Frequency of Social Gatherings with Friends and Family: Not on file    Attends Pentecostalism Services: Not on file    Active Member of Clubs or Organizations: Not on file    Attends Club or Organization Meetings: Not on file    Marital Status: Not on file   Intimate Partner Violence:     Fear of Current or Ex-Partner: Not on file    Emotionally Abused: Not on file    Physically Abused: Not on file    Sexually Abused: Not on file   Housing Stability:     Unable to Pay for Housing in the Last Year: Not on file    Number of [place] in the Last Year: Not on file    Unstable Housing in the Last Year: Not on file     Family History   Problem Relation Age of Onset    Diabetes Maternal Grandmother     High Blood Pressure Maternal Grandmother     High Cholesterol Maternal Grandmother     Cancer Maternal Grandmother         breast    Diabetes Maternal Grandfather     High Blood Pressure Maternal Grandfather     High Cholesterol Maternal Grandfather     Kidney Disease Maternal Grandfather     Other Maternal Grandfather         dementia    Thyroid Disease Maternal Aunt     Hypertension Father     Heart Attack Father 50    High Cholesterol Father      Allergies   Allergen Reactions    Penicillins Nausea And Vomiting       Current Outpatient Medications:     metFORMIN (GLUCOPHAGE) 500 MG tablet, Once a day, Disp: 90 tablet, Rfl: 3    ondansetron (ZOFRAN) 4 MG tablet, Take 1 tablet by mouth 3 times daily as needed for Nausea or Vomiting, Disp: 30 tablet, Rfl: 0    fluticasone (FLONASE) 50 MCG/ACT nasal spray, 1 spray by Each Nostril route daily, Disp: 2 Bottle, Rfl: 1    cetirizine (ZYRTEC) 10 MG tablet, Take 10 mg by mouth daily, Disp: , Rfl:     Multiple Vitamins-Minerals (WOMENS MULTI VITAMIN & MINERAL PO), Take by mouth, Disp: , Rfl:   Lab Results   Component Value Date     (L) 03/01/2022    K 4.6 03/01/2022     03/01/2022    CO2 26 03/01/2022    BUN 11 03/01/2022    CREATININE 0.63 03/01/2022    GLUCOSE 89 03/01/2022    CALCIUM 9.5 03/01/2022    PROT 7.8 07/16/2020    LABALBU 4.6 07/16/2020    BILITOT 0.6 07/16/2020    ALKPHOS 68 07/16/2020    AST 25 07/16/2020    ALT 17 07/16/2020    LABGLOM >60.0 03/01/2022    GFRAA >60.0 03/01/2022    GLOB 3.2 07/16/2020     Lab Results   Component Value Date    WBC 4.3 (L) 03/01/2022    HGB 13.7 03/01/2022    HCT 43.2 03/01/2022    MCV 85.3 03/01/2022     03/01/2022     Lab Results   Component Value Date    LABA1C 5.2 06/28/2021    LABA1C 5.5 07/16/2020     Lab Results   Component Value Date    CHOLFAST 227 (H) 07/16/2020    TRIGLYCFAST 59 07/16/2020    HDL 76 (H) 07/16/2020    HDL 59 09/13/2014    HDL 51 11/09/2013    LDLCALC 139 (H) 07/16/2020    LDLCALC 118 09/13/2014    LDLCALC 122 11/09/2013    CHOL 192 09/13/2014    CHOL 187 11/09/2013    TRIG 73 09/13/2014    TRIG 68 11/09/2013     No results found for: TESTM  Lab Results   Component Value Date    TSH 1.110 03/01/2022    TSH 0.599 07/20/2018    TSHREFLEX 0.762 06/28/2021    TSHREFLEX 1.210 07/16/2020    T4FREE 1.38 03/01/2022    T4FREE 1.20 06/28/2021    T4FREE 1.23 07/20/2018     Lab Results   Component Value Date    TPOABS <4.0 06/28/2021       Review of Systems   Constitutional: Negative for fatigue. HENT: Negative for trouble swallowing. Cardiovascular: Negative. Hematological: Positive for adenopathy. Objective:   Physical Exam  Vitals reviewed. Constitutional:       Appearance: Normal appearance. HENT:      Head: Normocephalic and atraumatic. Right Ear: External ear normal.      Left Ear: External ear normal.      Nose: Nose normal.   Eyes:      General: No scleral icterus. Right eye: No discharge. Left eye: No discharge. Extraocular Movements: Extraocular movements intact. Conjunctiva/sclera: Conjunctivae normal.   Cardiovascular:      Rate and Rhythm: Normal rate. Pulmonary:      Effort: Pulmonary effort is normal.   Musculoskeletal:         General: Normal range of motion. Cervical back: Normal range of motion and neck supple. Neurological:      General: No focal deficit present. Mental Status: She is alert.    Psychiatric:         Mood and Affect: Mood normal.         Behavior: Behavior normal.

## 2022-06-01 DIAGNOSIS — E88.81 INSULIN RESISTANCE: Primary | ICD-10-CM

## 2023-05-15 ENCOUNTER — APPOINTMENT (OUTPATIENT)
Dept: LAB | Facility: LAB | Age: 23
End: 2023-05-15

## 2023-06-02 ENCOUNTER — OFFICE VISIT (OUTPATIENT)
Dept: ENDOCRINOLOGY | Age: 23
End: 2023-06-02
Payer: COMMERCIAL

## 2023-06-02 VITALS
BODY MASS INDEX: 30.44 KG/M2 | OXYGEN SATURATION: 97 % | WEIGHT: 151 LBS | SYSTOLIC BLOOD PRESSURE: 116 MMHG | HEIGHT: 59 IN | HEART RATE: 61 BPM | DIASTOLIC BLOOD PRESSURE: 70 MMHG

## 2023-06-02 DIAGNOSIS — R63.5 WEIGHT GAIN: ICD-10-CM

## 2023-06-02 DIAGNOSIS — E88.81 INSULIN RESISTANCE: Primary | ICD-10-CM

## 2023-06-02 DIAGNOSIS — E88.81 INSULIN RESISTANCE: ICD-10-CM

## 2023-06-02 LAB
ANION GAP SERPL CALCULATED.3IONS-SCNC: 10 MEQ/L (ref 9–15)
BUN SERPL-MCNC: 8 MG/DL (ref 6–20)
CALCIUM SERPL-MCNC: 9.2 MG/DL (ref 8.5–9.9)
CHLORIDE SERPL-SCNC: 101 MEQ/L (ref 95–107)
CO2 SERPL-SCNC: 26 MEQ/L (ref 20–31)
CREAT SERPL-MCNC: 0.72 MG/DL (ref 0.5–0.9)
GLUCOSE SERPL-MCNC: 82 MG/DL (ref 70–99)
POTASSIUM SERPL-SCNC: 4.2 MEQ/L (ref 3.4–4.9)
SODIUM SERPL-SCNC: 137 MEQ/L (ref 135–144)

## 2023-06-02 PROCEDURE — 99213 OFFICE O/P EST LOW 20 MIN: CPT | Performed by: INTERNAL MEDICINE

## 2023-06-05 ASSESSMENT — ENCOUNTER SYMPTOMS
RESPIRATORY NEGATIVE: 1
EYES NEGATIVE: 1

## 2023-12-28 ENCOUNTER — OFFICE VISIT (OUTPATIENT)
Dept: FAMILY MEDICINE CLINIC | Age: 23
End: 2023-12-28
Payer: COMMERCIAL

## 2023-12-28 VITALS
BODY MASS INDEX: 33.06 KG/M2 | DIASTOLIC BLOOD PRESSURE: 82 MMHG | WEIGHT: 164 LBS | HEIGHT: 59 IN | TEMPERATURE: 97.4 F | HEART RATE: 99 BPM | OXYGEN SATURATION: 99 % | SYSTOLIC BLOOD PRESSURE: 120 MMHG

## 2023-12-28 DIAGNOSIS — L65.9 ALOPECIA: Primary | ICD-10-CM

## 2023-12-28 DIAGNOSIS — B95.8 STAPH INFECTION: Primary | ICD-10-CM

## 2023-12-28 DIAGNOSIS — L65.9 ALOPECIA: ICD-10-CM

## 2023-12-28 PROCEDURE — 99213 OFFICE O/P EST LOW 20 MIN: CPT | Performed by: FAMILY MEDICINE

## 2024-01-01 LAB
BACTERIA SPEC ANAEROBE+AEROBE CULT: ABNORMAL
BACTERIA SPEC ANAEROBE+AEROBE CULT: ABNORMAL
ORGANISM: ABNORMAL

## 2024-01-02 RX ORDER — SULFAMETHOXAZOLE AND TRIMETHOPRIM 800; 160 MG/1; MG/1
1 TABLET ORAL 2 TIMES DAILY
Qty: 14 TABLET | Refills: 0 | Status: SHIPPED | OUTPATIENT
Start: 2024-01-02 | End: 2024-01-09

## 2024-02-02 RX ORDER — SULFAMETHOXAZOLE AND TRIMETHOPRIM 800; 160 MG/1; MG/1
1 TABLET ORAL 2 TIMES DAILY
Qty: 14 TABLET | Refills: 0 | Status: SHIPPED | OUTPATIENT
Start: 2024-02-02 | End: 2024-02-09

## 2024-02-09 ENCOUNTER — OFFICE VISIT (OUTPATIENT)
Dept: FAMILY MEDICINE CLINIC | Age: 24
End: 2024-02-09
Payer: COMMERCIAL

## 2024-02-09 VITALS
HEIGHT: 59 IN | OXYGEN SATURATION: 97 % | SYSTOLIC BLOOD PRESSURE: 116 MMHG | HEART RATE: 83 BPM | DIASTOLIC BLOOD PRESSURE: 78 MMHG | WEIGHT: 164 LBS | BODY MASS INDEX: 33.06 KG/M2 | TEMPERATURE: 97.3 F

## 2024-02-09 DIAGNOSIS — E88.819 INSULIN RESISTANCE: ICD-10-CM

## 2024-02-09 DIAGNOSIS — Z11.3 SCREEN FOR STD (SEXUALLY TRANSMITTED DISEASE): ICD-10-CM

## 2024-02-09 DIAGNOSIS — L73.9 FOLLICULITIS: Primary | ICD-10-CM

## 2024-02-09 PROBLEM — E11.9 TYPE 2 DIABETES MELLITUS (HCC): Status: ACTIVE | Noted: 2024-02-09

## 2024-02-09 PROBLEM — E11.9 TYPE 2 DIABETES MELLITUS (HCC): Status: RESOLVED | Noted: 2024-02-09 | Resolved: 2024-02-09

## 2024-02-09 PROCEDURE — 99213 OFFICE O/P EST LOW 20 MIN: CPT | Performed by: FAMILY MEDICINE

## 2024-02-09 RX ORDER — CLINDAMYCIN PHOSPHATE 11.9 MG/ML
SOLUTION TOPICAL
Qty: 30 ML | Refills: 0 | Status: SHIPPED | OUTPATIENT
Start: 2024-02-09 | End: 2024-03-10

## 2024-02-09 ASSESSMENT — PATIENT HEALTH QUESTIONNAIRE - PHQ9
2. FEELING DOWN, DEPRESSED OR HOPELESS: 1
SUM OF ALL RESPONSES TO PHQ9 QUESTIONS 1 & 2: 2
2. FEELING DOWN, DEPRESSED OR HOPELESS: SEVERAL DAYS
SUM OF ALL RESPONSES TO PHQ9 QUESTIONS 1 & 2: 2
SUM OF ALL RESPONSES TO PHQ QUESTIONS 1-9: 2
SUM OF ALL RESPONSES TO PHQ QUESTIONS 1-9: 2
1. LITTLE INTEREST OR PLEASURE IN DOING THINGS: SEVERAL DAYS
1. LITTLE INTEREST OR PLEASURE IN DOING THINGS: 1
SUM OF ALL RESPONSES TO PHQ QUESTIONS 1-9: 2
SUM OF ALL RESPONSES TO PHQ QUESTIONS 1-9: 2

## 2024-02-09 NOTE — PROGRESS NOTES
Chief Complaint   Patient presents with    Sexually Transmitted Diseases     STD check, sexually active & wants to check for safety measures.    Health Maintenance     Refused flu vacc. Yes to HIV/Hep C please        HPI: Althea Manzo 23 y.o. female presenting for    Insulin resistance  Managed by endocrinology.  Last A1c's are normal.  Patient is on metformin prescribed by her endocrinologist.    STD screen  STD screen.  Patient is currently sexually active.  Would like to be screened for any sexually transmitted diseases.  Patient denies any vaginal discharge.  Not up-to-date on her Pap smear.    Folliculitis  Chronic issue for patient.  Patient reports that she has areas of folliculitis on her scalp.  Patient has been treated with Bactrim in the past which seems to resolve the issues.  However once she is done with antibiotics the symptoms recur.  Patient denies that she is picking at the area.      Current Outpatient Medications   Medication Sig Dispense Refill    clindamycin (CLEOCIN T) 1 % external solution Apply topically 1 times daily on the affected area. Use a small amount 30 mL 0    sulfamethoxazole-trimethoprim (BACTRIM DS;SEPTRA DS) 800-160 MG per tablet Take 1 tablet by mouth 2 times daily for 7 days 14 tablet 0    metFORMIN (GLUCOPHAGE) 500 MG tablet TAKE 1 TABLET BY MOUTH 2 TIMES A DAY WITH MEALS. 180 tablet 3    Multiple Vitamins-Minerals (WOMENS MULTI VITAMIN & MINERAL PO) Take by mouth       No current facility-administered medications for this visit.        ROS  CONSTITUTIONAL: The patient denies fevers, chills, sweats and body ache.  HEENT: Denies headache, blurry vision, eye pain, tinnitus, vertigo,  sore throat, neck or thyroid masses.    RESPIRATORY: Denies cough, sputum, hemoptysis.  CARDIAC: Denies chest pain, pressure, palpitations, Denies lower extremity edema.  GASTROINTESTINAL: Denies abdominal pain, constipation, bleeding in the stools, reports diarrhea.  GENITOURINARY: Denies

## 2024-02-10 LAB — HIV AG/AB: NONREACTIVE

## 2024-02-12 LAB — VDRL SER QL: NON REACTIVE

## 2024-02-13 LAB
C TRACH DNA UR QL NAA+PROBE: NEGATIVE
N GONORRHOEA DNA UR QL NAA+PROBE: NEGATIVE

## 2024-05-31 ENCOUNTER — HOSPITAL ENCOUNTER (EMERGENCY)
Age: 24
Discharge: HOME | End: 2024-05-31
Attending: EMERGENCY MEDICINE
Payer: COMMERCIAL

## 2024-05-31 ENCOUNTER — NURSE TRIAGE (OUTPATIENT)
Dept: OTHER | Facility: CLINIC | Age: 24
End: 2024-05-31

## 2024-05-31 ENCOUNTER — APPOINTMENT (OUTPATIENT)
Dept: CT IMAGING | Age: 24
End: 2024-05-31
Payer: COMMERCIAL

## 2024-05-31 VITALS
HEART RATE: 70 BPM | OXYGEN SATURATION: 98 % | RESPIRATION RATE: 18 BRPM | DIASTOLIC BLOOD PRESSURE: 79 MMHG | TEMPERATURE: 98.5 F | HEIGHT: 59 IN | WEIGHT: 165 LBS | BODY MASS INDEX: 33.26 KG/M2 | SYSTOLIC BLOOD PRESSURE: 100 MMHG

## 2024-05-31 DIAGNOSIS — K62.89 ACUTE PROCTITIS: Primary | ICD-10-CM

## 2024-05-31 LAB
ALBUMIN SERPL-MCNC: 4.5 G/DL (ref 3.5–4.6)
ALP SERPL-CCNC: 93 U/L (ref 40–130)
ALT SERPL-CCNC: 36 U/L (ref 0–33)
ANION GAP SERPL CALCULATED.3IONS-SCNC: 10 MEQ/L (ref 9–15)
AST SERPL-CCNC: 26 U/L (ref 0–35)
BASOPHILS # BLD: 0 K/UL (ref 0–0.1)
BASOPHILS NFR BLD: 0.4 % (ref 0.1–1.2)
BILIRUB SERPL-MCNC: 0.3 MG/DL (ref 0.2–0.7)
BILIRUB UR QL STRIP: NEGATIVE
BUN SERPL-MCNC: 14 MG/DL (ref 6–20)
CALCIUM SERPL-MCNC: 10.1 MG/DL (ref 8.5–9.9)
CHLORIDE SERPL-SCNC: 100 MEQ/L (ref 95–107)
CLARITY UR: CLEAR
CO2 SERPL-SCNC: 28 MEQ/L (ref 20–31)
COLOR UR: YELLOW
CREAT SERPL-MCNC: 0.72 MG/DL (ref 0.5–0.9)
EOSINOPHIL # BLD: 0.1 K/UL (ref 0–0.4)
EOSINOPHIL NFR BLD: 0.7 % (ref 0.7–5.8)
ERYTHROCYTE [DISTWIDTH] IN BLOOD BY AUTOMATED COUNT: 12.9 % (ref 11.7–14.4)
GLOBULIN SER CALC-MCNC: 3.6 G/DL (ref 2.3–3.5)
GLUCOSE SERPL-MCNC: 95 MG/DL (ref 70–99)
GLUCOSE UR STRIP-MCNC: NEGATIVE MG/DL
HCG UR QL: NEGATIVE
HCT VFR BLD AUTO: 41.7 % (ref 37–47)
HGB BLD-MCNC: 13.3 G/DL (ref 11.2–15.7)
HGB UR QL STRIP: NEGATIVE
IMM GRANULOCYTES # BLD: 0 K/UL
IMM GRANULOCYTES NFR BLD: 0.3 %
KETONES UR STRIP-MCNC: ABNORMAL MG/DL
LEUKOCYTE ESTERASE UR QL STRIP: NEGATIVE
LIPASE SERPL-CCNC: 33 U/L (ref 12–95)
LYMPHOCYTES # BLD: 3.1 K/UL (ref 1.2–3.7)
LYMPHOCYTES NFR BLD: 41.2 %
MCH RBC QN AUTO: 26.7 PG (ref 25.6–32.2)
MCHC RBC AUTO-ENTMCNC: 31.9 % (ref 32.2–35.5)
MCV RBC AUTO: 83.6 FL (ref 79.4–94.8)
MONOCYTES # BLD: 0.5 K/UL (ref 0.2–0.9)
MONOCYTES NFR BLD: 6.3 % (ref 4.7–12.5)
NEUTROPHILS # BLD: 3.9 K/UL (ref 1.6–6.1)
NEUTS SEG NFR BLD: 51.1 % (ref 34–71.1)
NITRITE UR QL STRIP: NEGATIVE
PH UR STRIP: 7 [PH] (ref 5–9)
PLATELET # BLD AUTO: 329 K/UL (ref 182–369)
POTASSIUM SERPL-SCNC: 3.8 MEQ/L (ref 3.4–4.9)
PROT SERPL-MCNC: 8.1 G/DL (ref 6.3–8)
PROT UR STRIP-MCNC: NEGATIVE MG/DL
RBC # BLD AUTO: 4.99 M/UL (ref 3.93–5.22)
SODIUM SERPL-SCNC: 138 MEQ/L (ref 135–144)
SP GR UR STRIP: 1.02 (ref 1–1.03)
URINE REFLEX TO CULTURE: ABNORMAL
UROBILINOGEN UR STRIP-ACNC: 0.2 E.U./DL
WBC # BLD AUTO: 7.6 K/UL (ref 4–10)

## 2024-05-31 PROCEDURE — 6360000002 HC RX W HCPCS

## 2024-05-31 PROCEDURE — 99285 EMERGENCY DEPT VISIT HI MDM: CPT

## 2024-05-31 PROCEDURE — 36415 COLL VENOUS BLD VENIPUNCTURE: CPT

## 2024-05-31 PROCEDURE — 2580000003 HC RX 258

## 2024-05-31 PROCEDURE — 83690 ASSAY OF LIPASE: CPT

## 2024-05-31 PROCEDURE — 80053 COMPREHEN METABOLIC PANEL: CPT

## 2024-05-31 PROCEDURE — 96374 THER/PROPH/DIAG INJ IV PUSH: CPT

## 2024-05-31 PROCEDURE — 6360000004 HC RX CONTRAST MEDICATION

## 2024-05-31 PROCEDURE — 85025 COMPLETE CBC W/AUTO DIFF WBC: CPT

## 2024-05-31 PROCEDURE — 96375 TX/PRO/DX INJ NEW DRUG ADDON: CPT

## 2024-05-31 PROCEDURE — 74177 CT ABD & PELVIS W/CONTRAST: CPT

## 2024-05-31 PROCEDURE — A4216 STERILE WATER/SALINE, 10 ML: HCPCS

## 2024-05-31 PROCEDURE — 84703 CHORIONIC GONADOTROPIN ASSAY: CPT

## 2024-05-31 PROCEDURE — 2500000003 HC RX 250 WO HCPCS

## 2024-05-31 PROCEDURE — 81003 URINALYSIS AUTO W/O SCOPE: CPT

## 2024-05-31 RX ORDER — DEXAMETHASONE 4 MG/1
4 TABLET ORAL 2 TIMES DAILY WITH MEALS
Qty: 20 TABLET | Refills: 0 | Status: SHIPPED | OUTPATIENT
Start: 2024-05-31 | End: 2024-06-10

## 2024-05-31 RX ORDER — 0.9 % SODIUM CHLORIDE 0.9 %
1000 INTRAVENOUS SOLUTION INTRAVENOUS ONCE
Status: COMPLETED | OUTPATIENT
Start: 2024-05-31 | End: 2024-05-31

## 2024-05-31 RX ORDER — CIPROFLOXACIN 500 MG/1
500 TABLET, FILM COATED ORAL 2 TIMES DAILY
Qty: 20 TABLET | Refills: 0 | Status: SHIPPED | OUTPATIENT
Start: 2024-05-31 | End: 2024-06-10

## 2024-05-31 RX ORDER — METRONIDAZOLE 500 MG/1
500 TABLET ORAL 3 TIMES DAILY
Qty: 30 TABLET | Refills: 0 | Status: SHIPPED | OUTPATIENT
Start: 2024-05-31 | End: 2024-05-31

## 2024-05-31 RX ORDER — ACETAMINOPHEN 500 MG
500 TABLET ORAL 4 TIMES DAILY PRN
Qty: 50 TABLET | Refills: 0 | Status: SHIPPED | OUTPATIENT
Start: 2024-05-31

## 2024-05-31 RX ORDER — METRONIDAZOLE 500 MG/1
500 TABLET ORAL 3 TIMES DAILY
Qty: 30 TABLET | Refills: 0 | Status: SHIPPED | OUTPATIENT
Start: 2024-05-31 | End: 2024-06-10

## 2024-05-31 RX ORDER — ACETAMINOPHEN 500 MG
500 TABLET ORAL 4 TIMES DAILY PRN
Qty: 50 TABLET | Refills: 0 | Status: SHIPPED | OUTPATIENT
Start: 2024-05-31 | End: 2024-05-31

## 2024-05-31 RX ORDER — DEXAMETHASONE 4 MG/1
4 TABLET ORAL 2 TIMES DAILY WITH MEALS
Qty: 20 TABLET | Refills: 0 | Status: SHIPPED | OUTPATIENT
Start: 2024-05-31 | End: 2024-05-31

## 2024-05-31 RX ORDER — CIPROFLOXACIN 500 MG/1
500 TABLET, FILM COATED ORAL 2 TIMES DAILY
Qty: 20 TABLET | Refills: 0 | Status: SHIPPED | OUTPATIENT
Start: 2024-05-31 | End: 2024-05-31

## 2024-05-31 RX ORDER — ONDANSETRON 2 MG/ML
4 INJECTION INTRAMUSCULAR; INTRAVENOUS ONCE
Status: COMPLETED | OUTPATIENT
Start: 2024-05-31 | End: 2024-05-31

## 2024-05-31 RX ADMIN — ONDANSETRON 4 MG: 2 INJECTION INTRAMUSCULAR; INTRAVENOUS at 19:12

## 2024-05-31 RX ADMIN — IOPAMIDOL 75 ML: 755 INJECTION, SOLUTION INTRAVENOUS at 20:21

## 2024-05-31 RX ADMIN — SODIUM CHLORIDE 1000 ML: 9 INJECTION, SOLUTION INTRAVENOUS at 19:12

## 2024-05-31 RX ADMIN — FAMOTIDINE 20 MG: 10 INJECTION, SOLUTION INTRAVENOUS at 19:11

## 2024-05-31 ASSESSMENT — PAIN SCALES - GENERAL: PAINLEVEL_OUTOF10: 5

## 2024-05-31 ASSESSMENT — LIFESTYLE VARIABLES
HOW OFTEN DO YOU HAVE A DRINK CONTAINING ALCOHOL: NEVER
HOW MANY STANDARD DRINKS CONTAINING ALCOHOL DO YOU HAVE ON A TYPICAL DAY: PATIENT DOES NOT DRINK

## 2024-05-31 ASSESSMENT — ENCOUNTER SYMPTOMS
BACK PAIN: 0
BLOOD IN STOOL: 1
SORE THROAT: 0
NAUSEA: 1
COUGH: 0
DIARRHEA: 0
VOMITING: 0
ANAL BLEEDING: 1
SHORTNESS OF BREATH: 0
ABDOMINAL PAIN: 0

## 2024-05-31 ASSESSMENT — PAIN DESCRIPTION - FREQUENCY: FREQUENCY: INTERMITTENT

## 2024-05-31 ASSESSMENT — PAIN - FUNCTIONAL ASSESSMENT
PAIN_FUNCTIONAL_ASSESSMENT: 0-10
PAIN_FUNCTIONAL_ASSESSMENT: ACTIVITIES ARE NOT PREVENTED

## 2024-05-31 ASSESSMENT — PAIN DESCRIPTION - LOCATION: LOCATION: ABDOMEN

## 2024-05-31 ASSESSMENT — PAIN DESCRIPTION - DESCRIPTORS: DESCRIPTORS: CRAMPING

## 2024-05-31 ASSESSMENT — PAIN DESCRIPTION - PAIN TYPE: TYPE: ACUTE PAIN

## 2024-05-31 NOTE — ED TRIAGE NOTES
Pt arrives to ED, from home, via personal vehicle-pt's SO is at her bedside.  Pt presents with sudden onset of rectal bleeding-pt states there is a mix of bright red and dark blood in the toilet, after a BM and on the toilet paper.  Pt denies any leaking of blood.

## 2024-05-31 NOTE — ED PROVIDER NOTES
[facility] ED  eMERGENCYdEPARTMENT eNCOUnter      Pt Name: Althea Manzo  MRN: [id number]  Birthdate 2000of evaluation: 5/31/2024  Provider:Colby Shanks MD    CHIEF COMPLAINT       Chief Complaint   Patient presents with    Rectal Bleeding     Onset: today         HISTORY OF PRESENT ILLNESS  (Location/Symptom, Timing/Onset, Context/Setting, Quality, Duration, Modifying Factors, Severity.)   Althea Manzo is a 23 y.o. female history of acne, seasonal allergies, who presents to the emergency department with rectal bleeding.  Patient presents to the emergency department with complaints of onset of rectal cramping with bright red blood and mixed dark blood in the stool that occurred at 5 PM.  Patient denies any history of ulcers, Crohn's, irritable bowels, colitis.  She does not take aspirin, utilize alcohol or NSAIDs.  She states she does have history of episodes of diarrhea but never has an issue with blood in her stool.  She states her stool was soft and not hard.  She denies any known history of hemorrhoids or fissures.  She complains of some nausea and cramping.  She has not taken any medication for her symptoms.  She denies any chest pain, shortness of breath, emesis, fever, chills, headache or recent illness.  Patient states she has recently been on antibiotics in the last few weeks for acne treatment but she is currently not on them.    HPI    Nursing Notes were reviewed and I agree.    REVIEW OF SYSTEMS    (2-9 systems for level 4, 10 or more for level 5)     Review of Systems   Constitutional:  Negative for activity change, chills and fever.   HENT:  Negative for ear pain and sore throat.    Eyes:  Negative for visual disturbance.   Respiratory:  Negative for cough and shortness of breath.    Cardiovascular:  Negative for chest pain, palpitations and leg swelling.   Gastrointestinal:  Positive for anal bleeding, blood in stool and nausea. Negative for abdominal pain, diarrhea and

## 2024-05-31 NOTE — TELEPHONE ENCOUNTER
Location of patient: [place]    Subjective: Caller states \"I've been having a reoccuring issues for a while. I just didn't realize it was serious. I get anal cramping but it feels like it is further up, like up in the intestines and then sometimes when I have that I will get dizzy or feel faint and then sometimes I will end up pooping and sometimes I wont. Today it happened and I got hot and was shaking and then nothing happened and then a little bit later I pooped and there was blood in and around the poop and there were some clots on the paper they kind of looked like scabs but with blood on them. The poop was more loose and then there was some diarrhea. I've been having more diarrhea than normal bowel movements in the past two months.\"     Current Symptoms: rectal/anal sphincter pain, intestinal pain, bloating    Onset: 2 years ago; worsening over last 4 to 5 months     Associated Symptoms: diarrhea, decreased appetite    Pain Severity: 5/10; cramping; constant    Temperature:  denies    What has been tried: nothing    LMP:  1 week ago  (first period in 3 months) Pregnant: No    Recommended disposition: See PCP within 24 Hours    Care advice provided, patient verbalizes understanding; denies any other questions or concerns; instructed to call back for any new or worsening symptoms.    Patient/caller agrees to proceed to nearest [facility] or ED within the next 24 hours.    Attention Provider:  Thank you for allowing me to participate in the care of your patient.  The patient was connected to triage in response to symptoms provided.   Please do not respond through this encounter as the response is not directed to a shared pool.      Reason for Disposition   MODERATE rectal bleeding (small blood clots, passing blood without stool, or toilet water turns red)    Protocols used: Rectal Bleeding-ADULT-

## 2024-06-01 NOTE — PROGRESS NOTES
Discharge instuctions reviewed with pt. Pt confirmed understanding with no further questions asked. Pt shows no s/s of distress. Pt able to ambulate out of facility accompanied by mother and male . No further complaints voiced.

## 2024-06-03 ENCOUNTER — CARE COORDINATION (OUTPATIENT)
Dept: OTHER | Facility: CLINIC | Age: 24
End: 2024-06-03

## 2024-06-03 ENCOUNTER — TELEPHONE (OUTPATIENT)
Dept: GASTROENTEROLOGY | Age: 24
End: 2024-06-03

## 2024-06-03 NOTE — CARE COORDINATION
Ambulatory Care Coordination Note    ACM attempted to reach patient for Associate Care Management related to initial ED follow up. No voicemail available, [employer] message sent.    Plan for follow-up call in 1-2 days    Future Appointments   Date Time Provider Department Center   2/21/2025 10:45 AM Melvin Persaud MD [facility]

## 2024-06-04 ENCOUNTER — CARE COORDINATION (OUTPATIENT)
Dept: OTHER | Facility: CLINIC | Age: 24
End: 2024-06-04

## 2024-06-04 NOTE — CARE COORDINATION
Ambulatory Care Coordination Note    ACM attempted 2nd outreach to patient for introduction to Associate Care Management related to ED follow up, complete assessment, discuss ACM needs. Not able to leave a message    Unable to Reach Letter sent to patient via Mail.    Will continue to outreach.      Future Appointments   Date Time Provider Department Center   2/21/2025 10:45 AM Melvin Persaud MD [facility]

## 2024-06-05 LAB
C TRACH DNA UR QL NAA+PROBE: NEGATIVE
N GONORRHOEA DNA UR QL NAA+PROBE: NEGATIVE

## 2024-06-12 ENCOUNTER — CARE COORDINATION (OUTPATIENT)
Dept: OTHER | Facility: CLINIC | Age: 24
End: 2024-06-12

## 2024-06-12 NOTE — CARE COORDINATION
Ambulatory Care Coordination Note     6/12/2024 4:55 PM     patient outreach attempt by this AC today to offer care management services. ACM was unable to reach the patient by telephone today; voicemail full and unable to leave a message.   letter mailed requesting patient to contact this AC.   mychart message sent requesting patient to contact this AC.     Patient closed (unable to reach patient) from the High Risk Care Management program on 6/12/2024.

## 2024-06-14 ENCOUNTER — TELEPHONE (OUTPATIENT)
Dept: FAMILY MEDICINE CLINIC | Age: 24
End: 2024-06-14

## 2024-06-28 ENCOUNTER — PATIENT MESSAGE (OUTPATIENT)
Dept: ENDOCRINOLOGY | Age: 24
End: 2024-06-28

## 2024-06-28 NOTE — TELEPHONE ENCOUNTER
From: Althea Manzo  To: Dr. Bebeto Lagos  Sent: 6/14/2024 6:56 PM EDT  Subject: Appointment Request    Appointment Request From: Althea Manzo    With Provider: Bebeto Lagos MD [[facility]]    Preferred Date Range: 6/17/2024 – 6/17/2024    Preferred Times: Any Time    Reason for visit: Request an Appointment    Comments:  Yearly check-in and medication refill.

## 2024-07-18 ENCOUNTER — PREP FOR PROCEDURE (OUTPATIENT)
Dept: GASTROENTEROLOGY | Age: 24
End: 2024-07-18

## 2024-07-18 ENCOUNTER — OFFICE VISIT (OUTPATIENT)
Dept: GASTROENTEROLOGY | Age: 24
End: 2024-07-18
Payer: COMMERCIAL

## 2024-07-18 VITALS — WEIGHT: 175 LBS | HEART RATE: 101 BPM | BODY MASS INDEX: 35.28 KG/M2 | HEIGHT: 59 IN | OXYGEN SATURATION: 97 %

## 2024-07-18 DIAGNOSIS — R19.7 DIARRHEA, UNSPECIFIED TYPE: Primary | ICD-10-CM

## 2024-07-18 DIAGNOSIS — K62.5 RECTAL BLEEDING: ICD-10-CM

## 2024-07-18 PROBLEM — E11.9 TYPE 2 DIABETES MELLITUS (HCC): Status: ACTIVE | Noted: 2024-07-18

## 2024-07-18 PROCEDURE — 99203 OFFICE O/P NEW LOW 30 MIN: CPT | Performed by: SPECIALIST

## 2024-07-18 RX ORDER — SODIUM, POTASSIUM,MAG SULFATES 17.5-3.13G
SOLUTION, RECONSTITUTED, ORAL ORAL
Qty: 354 ML | Refills: 0 | Status: SHIPPED | OUTPATIENT
Start: 2024-07-18

## 2024-07-18 ASSESSMENT — ENCOUNTER SYMPTOMS
DIARRHEA: 1
ABDOMINAL PAIN: 1

## 2024-07-18 NOTE — PROGRESS NOTES
Gastroenterology Clinic Visit    Althea Manzo  [id number]  Chief Complaint   Patient presents with    Follow-up     ER VISIT F/U BLOODY STOOL       HPI: 23 y.o. female presents to the clinic with history of diarrhea since last 3 months and was seen in the emergency room and patient developed rectal bleeding end of May.  Rectal bleeding has subsided and diarrhea also subsided about 2 weeks ago but since last few days patient noticed recurrence of symptoms with the mucus in the stool.  Also has crampy lower abdominal pain.  Feels nauseous but no emesis.  No fever or chills..  No history of back pain or arthralgia or skin rash or any ocular disease.  No history of antibiotics use prior to beginning of the symptoms.  No history of travel.  No family history of IBD.  No history of weight loss.  Social history does not smoke does not drink alcohol.  Patient had a CBC and CMP when she was seen in the ER and were unremarkable and also had a CT abdomen pelvis which showed possible proctitis    Review of Systems   Gastrointestinal:  Positive for abdominal pain and diarrhea.   Endocrine:        History of insulin resistance and patient was on metformin earlier, currently she is not taking metformin.        Past Medical History:   Diagnosis Date    Acne     Pes planus     has a prescription for orthotics    Seasonal allergies     zyrtec-D      No past surgical history on file.  Current Outpatient Medications on File Prior to Visit   Medication Sig Dispense Refill    metFORMIN (GLUCOPHAGE) 500 MG tablet TAKE 1 TABLET BY MOUTH 2 TIMES A DAY WITH MEALS. (Patient not taking: Reported on 7/18/2024) 180 tablet 3    Multiple Vitamins-Minerals (WOMENS MULTI VITAMIN & MINERAL PO) Take by mouth (Patient not taking: Reported on 7/18/2024)       No current facility-administered medications on file prior to visit.     Family History   Problem Relation Age of Onset    Diabetes Maternal Grandmother     High Blood Pressure Maternal

## 2024-07-19 RX ORDER — SODIUM CHLORIDE 9 MG/ML
INJECTION, SOLUTION INTRAVENOUS PRN
Status: CANCELLED | OUTPATIENT
Start: 2024-07-19

## 2024-07-19 RX ORDER — SODIUM CHLORIDE 0.9 % (FLUSH) 0.9 %
5-40 SYRINGE (ML) INJECTION PRN
Status: CANCELLED | OUTPATIENT
Start: 2024-07-19

## 2024-07-19 RX ORDER — SODIUM CHLORIDE 0.9 % (FLUSH) 0.9 %
5-40 SYRINGE (ML) INJECTION EVERY 12 HOURS SCHEDULED
Status: CANCELLED | OUTPATIENT
Start: 2024-07-19

## 2024-07-19 RX ORDER — SODIUM CHLORIDE 9 MG/ML
INJECTION, SOLUTION INTRAVENOUS CONTINUOUS
Status: CANCELLED | OUTPATIENT
Start: 2024-07-19

## 2024-08-15 RX ORDER — SODIUM CHLORIDE 0.9 % (FLUSH) 0.9 %
5-40 SYRINGE (ML) INJECTION PRN
Status: CANCELLED | OUTPATIENT
Start: 2024-08-15

## 2024-08-15 RX ORDER — SODIUM CHLORIDE 9 MG/ML
INJECTION, SOLUTION INTRAVENOUS PRN
Status: CANCELLED | OUTPATIENT
Start: 2024-08-15

## 2024-08-15 RX ORDER — SODIUM CHLORIDE 9 MG/ML
INJECTION, SOLUTION INTRAVENOUS CONTINUOUS
Status: CANCELLED | OUTPATIENT
Start: 2024-08-15

## 2024-08-15 RX ORDER — SODIUM CHLORIDE 0.9 % (FLUSH) 0.9 %
5-40 SYRINGE (ML) INJECTION EVERY 12 HOURS SCHEDULED
Status: CANCELLED | OUTPATIENT
Start: 2024-08-15

## 2024-08-25 ENCOUNTER — HOSPITAL ENCOUNTER (EMERGENCY)
Age: 24
Discharge: HOME | End: 2024-08-25
Payer: COMMERCIAL

## 2024-08-25 ENCOUNTER — APPOINTMENT (OUTPATIENT)
Dept: GENERAL RADIOLOGY | Age: 24
End: 2024-08-25
Payer: COMMERCIAL

## 2024-08-25 VITALS
SYSTOLIC BLOOD PRESSURE: 96 MMHG | BODY MASS INDEX: 35.28 KG/M2 | WEIGHT: 175 LBS | TEMPERATURE: 98 F | RESPIRATION RATE: 20 BRPM | HEIGHT: 59 IN | HEART RATE: 76 BPM | DIASTOLIC BLOOD PRESSURE: 60 MMHG | OXYGEN SATURATION: 100 %

## 2024-08-25 DIAGNOSIS — R19.7 DIARRHEA, UNSPECIFIED TYPE: ICD-10-CM

## 2024-08-25 DIAGNOSIS — R55 SYNCOPE, UNSPECIFIED SYNCOPE TYPE: Primary | ICD-10-CM

## 2024-08-25 LAB
ALBUMIN SERPL-MCNC: 4.6 G/DL (ref 3.5–4.6)
ALP SERPL-CCNC: 96 U/L (ref 40–130)
ALT SERPL-CCNC: 26 U/L (ref 0–33)
ANION GAP SERPL CALCULATED.3IONS-SCNC: 14 MEQ/L (ref 9–15)
AST SERPL-CCNC: 34 U/L (ref 0–35)
BASOPHILS # BLD: 0 K/UL (ref 0–0.2)
BASOPHILS NFR BLD: 0.2 %
BILIRUB SERPL-MCNC: 0.5 MG/DL (ref 0.2–0.7)
BUN SERPL-MCNC: 9 MG/DL (ref 6–20)
CALCIUM SERPL-MCNC: 9.7 MG/DL (ref 8.5–9.9)
CHLORIDE SERPL-SCNC: 102 MEQ/L (ref 95–107)
CO2 SERPL-SCNC: 23 MEQ/L (ref 20–31)
CREAT SERPL-MCNC: 0.84 MG/DL (ref 0.5–0.9)
EOSINOPHIL # BLD: 0.1 K/UL (ref 0–0.7)
EOSINOPHIL NFR BLD: 0.6 %
ERYTHROCYTE [DISTWIDTH] IN BLOOD BY AUTOMATED COUNT: 12.7 % (ref 11.5–14.5)
GLOBULIN SER CALC-MCNC: 3.9 G/DL (ref 2.3–3.5)
GLUCOSE BLD-MCNC: 82 MG/DL (ref 70–99)
GLUCOSE SERPL-MCNC: 93 MG/DL (ref 70–99)
HCG SERPL QL: NEGATIVE
HCT VFR BLD AUTO: 42.7 % (ref 37–47)
HGB BLD-MCNC: 14 G/DL (ref 12–16)
LIPASE SERPL-CCNC: 27 U/L (ref 12–95)
LYMPHOCYTES # BLD: 1.9 K/UL (ref 1–4.8)
LYMPHOCYTES NFR BLD: 23.1 %
MAGNESIUM SERPL-MCNC: 1.8 MG/DL (ref 1.7–2.4)
MCH RBC QN AUTO: 27.3 PG (ref 27–31.3)
MCHC RBC AUTO-ENTMCNC: 32.8 % (ref 33–37)
MCV RBC AUTO: 83.4 FL (ref 79.4–94.8)
MONOCYTES # BLD: 0.5 K/UL (ref 0.2–0.8)
MONOCYTES NFR BLD: 6 %
NEUTROPHILS # BLD: 5.8 K/UL (ref 1.4–6.5)
NEUTS SEG NFR BLD: 69.9 %
PERFORMED ON: NORMAL
PLATELET # BLD AUTO: 341 K/UL (ref 130–400)
POTASSIUM SERPL-SCNC: 5.6 MEQ/L (ref 3.4–4.9)
PROT SERPL-MCNC: 8.5 G/DL (ref 6.3–8)
RBC # BLD AUTO: 5.12 M/UL (ref 4.2–5.4)
SODIUM SERPL-SCNC: 139 MEQ/L (ref 135–144)
TROPONIN, HIGH SENSITIVITY: <6 NG/L (ref 0–19)
WBC # BLD AUTO: 8.3 K/UL (ref 4.8–10.8)

## 2024-08-25 PROCEDURE — 83735 ASSAY OF MAGNESIUM: CPT

## 2024-08-25 PROCEDURE — 83690 ASSAY OF LIPASE: CPT

## 2024-08-25 PROCEDURE — 71045 X-RAY EXAM CHEST 1 VIEW: CPT

## 2024-08-25 PROCEDURE — 93005 ELECTROCARDIOGRAM TRACING: CPT | Performed by: PERSONAL EMERGENCY RESPONSE ATTENDANT

## 2024-08-25 PROCEDURE — 99285 EMERGENCY DEPT VISIT HI MDM: CPT

## 2024-08-25 PROCEDURE — 2580000003 HC RX 258: Performed by: PERSONAL EMERGENCY RESPONSE ATTENDANT

## 2024-08-25 PROCEDURE — 96374 THER/PROPH/DIAG INJ IV PUSH: CPT

## 2024-08-25 PROCEDURE — 36415 COLL VENOUS BLD VENIPUNCTURE: CPT

## 2024-08-25 PROCEDURE — 6360000002 HC RX W HCPCS: Performed by: PERSONAL EMERGENCY RESPONSE ATTENDANT

## 2024-08-25 PROCEDURE — 80053 COMPREHEN METABOLIC PANEL: CPT

## 2024-08-25 PROCEDURE — 84703 CHORIONIC GONADOTROPIN ASSAY: CPT

## 2024-08-25 PROCEDURE — 85025 COMPLETE CBC W/AUTO DIFF WBC: CPT

## 2024-08-25 PROCEDURE — 84484 ASSAY OF TROPONIN QUANT: CPT

## 2024-08-25 RX ORDER — 0.9 % SODIUM CHLORIDE 0.9 %
1000 INTRAVENOUS SOLUTION INTRAVENOUS ONCE
Status: COMPLETED | OUTPATIENT
Start: 2024-08-25 | End: 2024-08-25

## 2024-08-25 RX ORDER — ONDANSETRON 2 MG/ML
4 INJECTION INTRAMUSCULAR; INTRAVENOUS ONCE
Status: COMPLETED | OUTPATIENT
Start: 2024-08-25 | End: 2024-08-25

## 2024-08-25 RX ADMIN — ONDANSETRON 4 MG: 2 INJECTION INTRAMUSCULAR; INTRAVENOUS at 21:22

## 2024-08-25 RX ADMIN — SODIUM CHLORIDE 1000 ML: 9 INJECTION, SOLUTION INTRAVENOUS at 21:21

## 2024-08-25 ASSESSMENT — LIFESTYLE VARIABLES
HOW MANY STANDARD DRINKS CONTAINING ALCOHOL DO YOU HAVE ON A TYPICAL DAY: PATIENT DOES NOT DRINK
HOW OFTEN DO YOU HAVE A DRINK CONTAINING ALCOHOL: NEVER

## 2024-08-25 ASSESSMENT — PAIN SCALES - GENERAL: PAINLEVEL_OUTOF10: 5

## 2024-08-25 ASSESSMENT — PAIN DESCRIPTION - LOCATION: LOCATION: ABDOMEN

## 2024-08-25 ASSESSMENT — PAIN - FUNCTIONAL ASSESSMENT: PAIN_FUNCTIONAL_ASSESSMENT: 0-10

## 2024-08-26 ENCOUNTER — HOSPITAL ENCOUNTER (OUTPATIENT)
Age: 24
Setting detail: OUTPATIENT SURGERY
Discharge: HOME | End: 2024-08-26
Attending: SPECIALIST | Admitting: SPECIALIST
Payer: COMMERCIAL

## 2024-08-26 ENCOUNTER — ANESTHESIA EVENT (OUTPATIENT)
Dept: ENDOSCOPY | Age: 24
End: 2024-08-26
Payer: COMMERCIAL

## 2024-08-26 ENCOUNTER — ANESTHESIA (OUTPATIENT)
Dept: ENDOSCOPY | Age: 24
End: 2024-08-26
Payer: COMMERCIAL

## 2024-08-26 VITALS
SYSTOLIC BLOOD PRESSURE: 111 MMHG | HEART RATE: 98 BPM | DIASTOLIC BLOOD PRESSURE: 71 MMHG | TEMPERATURE: 98 F | BODY MASS INDEX: 35.28 KG/M2 | OXYGEN SATURATION: 100 % | RESPIRATION RATE: 18 BRPM | WEIGHT: 175 LBS | HEIGHT: 59 IN

## 2024-08-26 DIAGNOSIS — R19.7 DIARRHEA, UNSPECIFIED TYPE: ICD-10-CM

## 2024-08-26 DIAGNOSIS — K62.5 RECTAL BLEEDING: ICD-10-CM

## 2024-08-26 LAB
EKG ATRIAL RATE: 78 BPM
EKG P AXIS: 40 DEGREES
EKG P-R INTERVAL: 152 MS
EKG Q-T INTERVAL: 382 MS
EKG QRS DURATION: 72 MS
EKG QTC CALCULATION (BAZETT): 435 MS
EKG R AXIS: 36 DEGREES
EKG T AXIS: 33 DEGREES
EKG VENTRICULAR RATE: 78 BPM
HCG, URINE, POC: NEGATIVE
Lab: NORMAL
NEGATIVE QC PASS/FAIL: NORMAL
POSITIVE QC PASS/FAIL: NORMAL

## 2024-08-26 PROCEDURE — 3700000001 HC ADD 15 MINUTES (ANESTHESIA): Performed by: SPECIALIST

## 2024-08-26 PROCEDURE — 6360000002 HC RX W HCPCS: Performed by: NURSE ANESTHETIST, CERTIFIED REGISTERED

## 2024-08-26 PROCEDURE — 3609027000 HC COLONOSCOPY: Performed by: SPECIALIST

## 2024-08-26 PROCEDURE — 2580000003 HC RX 258: Performed by: SPECIALIST

## 2024-08-26 PROCEDURE — 2500000003 HC RX 250 WO HCPCS: Performed by: NURSE ANESTHETIST, CERTIFIED REGISTERED

## 2024-08-26 PROCEDURE — 88305 TISSUE EXAM BY PATHOLOGIST: CPT

## 2024-08-26 PROCEDURE — 3700000000 HC ANESTHESIA ATTENDED CARE: Performed by: SPECIALIST

## 2024-08-26 PROCEDURE — 7100000010 HC PHASE II RECOVERY - FIRST 15 MIN: Performed by: SPECIALIST

## 2024-08-26 PROCEDURE — 88342 IMHCHEM/IMCYTCHM 1ST ANTB: CPT

## 2024-08-26 PROCEDURE — 93010 ELECTROCARDIOGRAM REPORT: CPT | Performed by: INTERNAL MEDICINE

## 2024-08-26 PROCEDURE — 2709999900 HC NON-CHARGEABLE SUPPLY: Performed by: SPECIALIST

## 2024-08-26 PROCEDURE — 7100000011 HC PHASE II RECOVERY - ADDTL 15 MIN: Performed by: SPECIALIST

## 2024-08-26 RX ORDER — SODIUM CHLORIDE 0.9 % (FLUSH) 0.9 %
5-40 SYRINGE (ML) INJECTION EVERY 12 HOURS SCHEDULED
Status: DISCONTINUED | OUTPATIENT
Start: 2024-08-26 | End: 2024-08-26 | Stop reason: HOSPADM

## 2024-08-26 RX ORDER — SODIUM CHLORIDE 9 MG/ML
INJECTION, SOLUTION INTRAVENOUS CONTINUOUS
Status: DISCONTINUED | OUTPATIENT
Start: 2024-08-26 | End: 2024-08-26 | Stop reason: HOSPADM

## 2024-08-26 RX ORDER — SODIUM CHLORIDE 0.9 % (FLUSH) 0.9 %
5-40 SYRINGE (ML) INJECTION PRN
Status: DISCONTINUED | OUTPATIENT
Start: 2024-08-26 | End: 2024-08-26 | Stop reason: HOSPADM

## 2024-08-26 RX ORDER — PROPOFOL 10 MG/ML
INJECTION, EMULSION INTRAVENOUS PRN
Status: DISCONTINUED | OUTPATIENT
Start: 2024-08-26 | End: 2024-08-26 | Stop reason: SDUPTHER

## 2024-08-26 RX ORDER — SODIUM CHLORIDE 9 MG/ML
INJECTION, SOLUTION INTRAVENOUS PRN
Status: DISCONTINUED | OUTPATIENT
Start: 2024-08-26 | End: 2024-08-26 | Stop reason: HOSPADM

## 2024-08-26 RX ORDER — LIDOCAINE HYDROCHLORIDE 20 MG/ML
INJECTION, SOLUTION INFILTRATION; PERINEURAL PRN
Status: DISCONTINUED | OUTPATIENT
Start: 2024-08-26 | End: 2024-08-26 | Stop reason: SDUPTHER

## 2024-08-26 RX ADMIN — PROPOFOL 50 MG: 10 INJECTION, EMULSION INTRAVENOUS at 08:47

## 2024-08-26 RX ADMIN — SODIUM CHLORIDE: 9 INJECTION, SOLUTION INTRAVENOUS at 08:29

## 2024-08-26 RX ADMIN — PROPOFOL 170 MG: 10 INJECTION, EMULSION INTRAVENOUS at 08:35

## 2024-08-26 RX ADMIN — LIDOCAINE HYDROCHLORIDE 50 MG: 20 INJECTION, SOLUTION INFILTRATION; PERINEURAL at 08:35

## 2024-08-26 RX ADMIN — PROPOFOL 50 MG: 10 INJECTION, EMULSION INTRAVENOUS at 08:39

## 2024-08-26 RX ADMIN — PROPOFOL 50 MG: 10 INJECTION, EMULSION INTRAVENOUS at 08:43

## 2024-08-26 ASSESSMENT — PAIN - FUNCTIONAL ASSESSMENT: PAIN_FUNCTIONAL_ASSESSMENT: 0-10

## 2024-08-26 NOTE — ED TRIAGE NOTES
Pt to ER with c/o syncopal episode while in bathroom, pt has been prepping for colonoscopy and states she is feeling weak and passed out, pt keeps nodding off in triage, pt is also diabetic and has not checked her blood sugar today

## 2024-08-26 NOTE — H&P
Patient Name: Althea Manzo  : 2000  MRN: [id number]  DATE: 24      ENDOSCOPY  History and Physical    Procedure:    [x] Diagnostic Colonoscopy       [] Screening Colonoscopy  [] EGD      [] ERCP      [] EUS       [] Other    [x] Previous office notes/History and Physical reviewed from the patients chart. Please see EMR for further details of HPI. I have examined the patient's status immediately prior to the procedure and:      Indications/HPI:    []Abdominal Pain  []Cancer- GI/Lung  []Fhx of colon CA/polyps  []History of Polyps  []Kerr’s   []Melena  []Abnormal Imaging  []Dysphagia    []Persistent Pneumonia  []Anemia  []Food Impaction  []History of Polyps  []GI Bleed  []Pulmonary nodule/Mass  []Change in bowel habits []Heartburn/Reflux  []Rectal Bleed (BRBPR)  []Chest Pain - Non Cardiac []Heme (+) Stoo  l[]Ulcers  []Constipation  []Hemoptysis   []Varices  []Diarrhea  []Hypoxemia  []Nausea/Vomiting  []Screening   []Crohns/Colitis  []Other: Diarrhea and rectal bleeding    Anesthesia:   [x] MAC [] Moderate Sedation   [] General   [] None     ROS: 12 pt Review of Symptoms was negative unless mentioned above    Medications:   Prior to Admission medications    Medication Sig Start Date End Date Taking? Authorizing Provider   sodium-potassium-mag sulfate (SUPREP) 17.5-3.13-1.6 GM/177ML SOLN solution As directed 24   Rene Arzate MD   metFORMIN (GLUCOPHAGE) 500 MG tablet TAKE 1 TABLET BY MOUTH 2 TIMES A DAY WITH MEALS.  Patient not taking: Reported on 2024   Bebeto Lagos MD   Multiple Vitamins-Minerals (WOMENS MULTI VITAMIN & MINERAL PO) Take by mouth  Patient not taking: Reported on 2024    ProviderLucrecia MD       Allergies:   Allergies   Allergen Reactions    Penicillins Nausea And Vomiting        History of allergic reaction to anesthesia:  No    Past Medical History:  Past Medical History:   Diagnosis Date    Acne     Pes planus     has a prescription for orthotics     Seasonal allergies     zyrtec-D       Past Surgical History:  No past surgical history on file.    Social History:  Social History     Tobacco Use    Smoking status: Never    Smokeless tobacco: Never   Substance Use Topics    Alcohol use: No    Drug use: Never       Vital Signs:   There were no vitals filed for this visit.     Physical Exam:  Cardiac:  [x]WNL  []Comments:  Pulmonary:  [x]WNL   []Comments:   Neuro/Mental Status:  [x]WNL  []Comments:  Abdominal:  [x]WNL    []Comments:  Other:   []WNL  []Comments:    Informed Consent:  The risks and benefits of the procedure have been discussed with either the patient or if they cannot consent, their representative.    Assessment:  Patient examined and appropriate for planned sedation and procedure.     Plan:  Proceed with planned sedation and procedure as above.    Rene Arzate MD  8:11 AM

## 2024-08-26 NOTE — ANESTHESIA PRE PROCEDURE
Department of Anesthesiology  Preprocedure Note       Name:  Althea Manzo   Age:  23 y.o.  :  2000                                          MRN:  [id number]         Date:  2024      Surgeon: Surgeon(s):  Rene Arzate MD    Procedure: Procedure(s):  COLONOSCOPY DIAGNOSTIC    Medications prior to admission:   Prior to Admission medications    Medication Sig Start Date End Date Taking? Authorizing Provider   sodium-potassium-mag sulfate (SUPREP) 17.5-3.13-1.6 GM/177ML SOLN solution As directed 24   Rene Arzate MD   metFORMIN (GLUCOPHAGE) 500 MG tablet TAKE 1 TABLET BY MOUTH 2 TIMES A DAY WITH MEALS.  Patient not taking: Reported on 2024   Bebeto Lagos MD   Multiple Vitamins-Minerals (WOMENS MULTI VITAMIN & MINERAL PO) Take by mouth  Patient not taking: Reported on 2024    Provider, MD Lucrecia       Current medications:    No current facility-administered medications for this encounter.       Allergies:    Allergies   Allergen Reactions    Penicillins Nausea And Vomiting       Problem List:    Patient Active Problem List   Diagnosis Code    Type 2 diabetes mellitus E11.9    Diarrhea, unspecified R19.7    Rectal bleeding K62.5       Past Medical History:        Diagnosis Date    Acne     Pes planus     has a prescription for orthotics    Seasonal allergies     zyrtec-D       Past Surgical History:  No past surgical history on file.    Social History:    Social History     Tobacco Use    Smoking status: Never    Smokeless tobacco: Never   Substance Use Topics    Alcohol use: No                                Counseling given: Not Answered      Vital Signs (Current): There were no vitals filed for this visit.                                           BP Readings from Last 3 Encounters:   24 96/60   24 100/79   24 116/78       NPO Status:                                                                                 BMI:   Wt Readings from Last 3

## 2024-08-26 NOTE — ED PROVIDER NOTES
[facility] ED  eMERGENCY dEPARTMENT eNCOUnter      Pt Name: Althea Manzo  MRN: [id number]  Birthdate 2000  Date of evaluation: 8/25/2024  Provider: OBDULIA CANELA  8:08 PM EDT     My attending is Dr. Zhao    HISTORY OF PRESENT ILLNESS    Althea Manzo is a 23 y.o. female with PMHx of DM2 presents to the emergency department with syncope. Pt is supposed to have colonoscopy tomorrow at 9am for rectal bleeding. Pt started colon prep at 7pm. Last food intake was last night at 10pm.  Says she has been in Powerade and water throughout the day.  Around 7 PM this evening she started second colon prep, was sitting on the toilet, felt lightheaded, nauseous, difficulty breathing, and upper abdominal pain had syncopal episode.  Did not fall off the toilet, no injuries.  4 episodes of diarrhea today, nonbloody.  She does complain of nausea still.  She denies fevers, chest pain, urinary symptoms.  No drugs or alcohol. OT sugar 80s per triage         HPI    Nursing Notes were reviewed.    REVIEW OF SYSTEMS       Review of Systems   Constitutional:  Negative for appetite change, chills and fever.   HENT:  Negative for congestion, rhinorrhea and sore throat.    Respiratory:  Negative for cough and shortness of breath.    Cardiovascular:  Negative for chest pain.   Gastrointestinal:  Positive for abdominal pain, diarrhea and nausea. Negative for blood in stool and vomiting.   Genitourinary:  Negative for difficulty urinating.   Musculoskeletal:  Negative for neck stiffness.   Skin:  Negative for color change and rash.   Neurological:  Positive for syncope and light-headedness. Negative for dizziness, weakness, numbness and headaches.   All other systems reviewed and are negative.            PAST MEDICAL HISTORY     Past Medical History:   Diagnosis Date    Acne     Pes planus     has a prescription for orthotics    Seasonal allergies     zyrtec-D         SURGICAL HISTORY     History reviewed. No pertinent  Patient demonstrates understanding.    CRITICAL CARE TIME   Total Critical Caretime was 0 minutes, excluding separately reportable procedures.  There was a high probability of clinically significant/life threatening deterioration in the patient's condition which required my urgent intervention.        Procedures    FINAL IMPRESSION      1. Syncope, unspecified syncope type    2. Diarrhea, unspecified type          DISPOSITION/PLAN   DISPOSITION Discharge - Pending Orders Complete 08/25/2024 09:56:14 PM  Condition at Disposition: Good      PATIENT REFERRED TO:  Call the office in the morning to make them aware of syncopal episode            DISCHARGE MEDICATIONS:  New Prescriptions    No medications on file          (Please notethat portions of this note were completed with a voice recognition program.  Efforts were made to edit the dictations but occasionally words are mis-transcribed.)    OBDULIA CANELA (electronically signed)  Emergency Physician Assistant         Monae Diego PA  08/25/24 [id number]

## 2024-10-24 ENCOUNTER — OFFICE VISIT (OUTPATIENT)
Dept: FAMILY MEDICINE CLINIC | Age: 24
End: 2024-10-24
Payer: COMMERCIAL

## 2024-10-24 VITALS
TEMPERATURE: 97.2 F | SYSTOLIC BLOOD PRESSURE: 133 MMHG | OXYGEN SATURATION: 98 % | BODY MASS INDEX: 35.28 KG/M2 | DIASTOLIC BLOOD PRESSURE: 70 MMHG | HEART RATE: 98 BPM | HEIGHT: 59 IN | WEIGHT: 175 LBS

## 2024-10-24 DIAGNOSIS — R05.8 OTHER COUGH: ICD-10-CM

## 2024-10-24 DIAGNOSIS — R05.1 ACUTE COUGH: ICD-10-CM

## 2024-10-24 DIAGNOSIS — J40 BRONCHITIS: ICD-10-CM

## 2024-10-24 DIAGNOSIS — H66.92 LEFT ACUTE OTITIS MEDIA: Primary | ICD-10-CM

## 2024-10-24 DIAGNOSIS — R68.89 FLU-LIKE SYMPTOMS: ICD-10-CM

## 2024-10-24 PROCEDURE — 87426 SARSCOV CORONAVIRUS AG IA: CPT | Performed by: NURSE PRACTITIONER

## 2024-10-24 PROCEDURE — 99213 OFFICE O/P EST LOW 20 MIN: CPT | Performed by: NURSE PRACTITIONER

## 2024-10-24 RX ORDER — ALBUTEROL SULFATE 90 UG/1
2 INHALANT RESPIRATORY (INHALATION) 4 TIMES DAILY PRN
Qty: 18 G | Refills: 0 | Status: SHIPPED | OUTPATIENT
Start: 2024-10-24

## 2024-10-24 RX ORDER — DEXTROMETHORPHAN HYDROBROMIDE AND PROMETHAZINE HYDROCHLORIDE 15; 6.25 MG/5ML; MG/5ML
5 SYRUP ORAL 4 TIMES DAILY PRN
Qty: 140 ML | Refills: 0 | Status: SHIPPED | OUTPATIENT
Start: 2024-10-24 | End: 2024-10-31

## 2024-10-24 RX ORDER — METHYLPREDNISOLONE 4 MG/1
TABLET ORAL
Qty: 1 KIT | Refills: 0 | Status: SHIPPED | OUTPATIENT
Start: 2024-10-24 | End: 2024-10-30

## 2024-10-24 RX ORDER — AZITHROMYCIN 250 MG/1
TABLET, FILM COATED ORAL
Qty: 6 TABLET | Refills: 0 | Status: SHIPPED | OUTPATIENT
Start: 2024-10-24 | End: 2024-11-03

## 2024-10-24 ASSESSMENT — ENCOUNTER SYMPTOMS
SINUS PAIN: 1
RHINORRHEA: 1
ABDOMINAL PAIN: 0
EYE PAIN: 0
COUGH: 1
EYE DISCHARGE: 0
DIARRHEA: 0
SORE THROAT: 1
EYE REDNESS: 0
VOMITING: 0
SINUS PRESSURE: 1
NAUSEA: 0
SHORTNESS OF BREATH: 1
ABDOMINAL DISTENTION: 0
CHEST TIGHTNESS: 1
WHEEZING: 1
EYE ITCHING: 0

## 2024-10-24 NOTE — PROGRESS NOTES
Orders Placed This Encounter   Medications    azithromycin (ZITHROMAX) 250 MG tablet     Simg on day 1 followed by 250mg on days 2 - 5     Dispense:  6 tablet     Refill:  0    albuterol sulfate HFA (VENTOLIN HFA) 108 (90 Base) MCG/ACT inhaler     Sig: Inhale 2 puffs into the lungs 4 times daily as needed for Wheezing     Dispense:  18 g     Refill:  0    methylPREDNISolone (MEDROL DOSEPACK) 4 MG tablet     Sig: Take by mouth.     Dispense:  1 kit     Refill:  0    promethazine-dextromethorphan (PROMETHAZINE-DM) 6.25-15 MG/5ML syrup     Sig: Take 5 mLs by mouth 4 times daily as needed for Cough     Dispense:  140 mL     Refill:  0     There are no discontinued medications.  Return for worsening of condition, if symptoms do not improve in 3-5 days.        Reviewed with the patient/family: current clinical status & medications.  Side effects of the medication prescribed today, as well as treatment plan/rationale and result expectations have been discussed with the patient/family who expresses understanding. Patient will be discharged home in stable condition.    Follow up with PCP to evaluate treatment results or return if symptoms worsen or fail to improve. Discussed signs and symptoms which require immediate follow-up in ED/call to 911.  Understanding verbalized.     I have reviewed the patient's medical history in detail and updated the computerized patient record.    SWETA RODRIGUEZ, JAMAL - CNP

## 2025-03-14 ENCOUNTER — PATIENT MESSAGE (OUTPATIENT)
Age: 25
End: 2025-03-14

## 2025-03-14 NOTE — TELEPHONE ENCOUNTER
Left voicemail to please call into office at [phone], you can also schedule through [employer]

## (undated) DEVICE — TUBING IRRIGATION 140/160/180/190 SER GI ENDOSCP SMARTCAP

## (undated) DEVICE — TUBE SET 96 MM 64 MM H2O PERISTALTIC STD AUX CHANNEL

## (undated) DEVICE — ENDO CARRY-ON PROCEDURE KIT: Brand: ENDO CARRY-ON PROCEDURE KIT

## (undated) DEVICE — SINGLE PORT MANIFOLD: Brand: NEPTUNE 2

## (undated) DEVICE — BRUSH ENDO CLN L90.5IN SHTH DIA1.7MM BRIST DIA5-7MM 2-6MM

## (undated) DEVICE — FORCEPS BX L240CM JAW DIA2.8MM L CAP W/ NDL MIC MESH TOOTH

## (undated) DEVICE — TUBING, SUCTION, 1/4" X 10', STRAIGHT: Brand: MEDLINE